# Patient Record
Sex: FEMALE | Race: WHITE
[De-identification: names, ages, dates, MRNs, and addresses within clinical notes are randomized per-mention and may not be internally consistent; named-entity substitution may affect disease eponyms.]

---

## 2017-10-03 ENCOUNTER — HOSPITAL ENCOUNTER (INPATIENT)
Dept: HOSPITAL 74 - JER | Age: 82
LOS: 3 days | Discharge: HOME | DRG: 89 | End: 2017-10-06
Attending: FAMILY MEDICINE | Admitting: FAMILY MEDICINE
Payer: COMMERCIAL

## 2017-10-03 VITALS — BODY MASS INDEX: 30.1 KG/M2

## 2017-10-03 DIAGNOSIS — E78.5: ICD-10-CM

## 2017-10-03 DIAGNOSIS — K21.9: ICD-10-CM

## 2017-10-03 DIAGNOSIS — I45.2: ICD-10-CM

## 2017-10-03 DIAGNOSIS — Z79.84: ICD-10-CM

## 2017-10-03 DIAGNOSIS — R51: ICD-10-CM
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
## 2017-10-03 DIAGNOSIS — J44.9: ICD-10-CM

## 2017-10-03 DIAGNOSIS — E03.9: ICD-10-CM
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
## 2017-10-03 DIAGNOSIS — R27.0: ICD-10-CM

## 2017-10-03 DIAGNOSIS — I10: ICD-10-CM

## 2017-10-03 DIAGNOSIS — E11.65: ICD-10-CM
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
## 2017-10-03 DIAGNOSIS — G62.9: ICD-10-CM

## 2017-10-03 DIAGNOSIS — S06.0X0A: Primary | ICD-10-CM

## 2017-10-03 LAB
ALBUMIN SERPL-MCNC: 3.8 G/DL (ref 3.4–5)
ALP SERPL-CCNC: 68 U/L (ref 45–117)
ALT SERPL-CCNC: 24 U/L (ref 12–78)
ANION GAP SERPL CALC-SCNC: 7 MMOL/L (ref 8–16)
APPEARANCE UR: CLEAR
AST SERPL-CCNC: 22 U/L (ref 15–37)
BASOPHILS # BLD: 0.7 % (ref 0–2)
BILIRUB SERPL-MCNC: 0.2 MG/DL (ref 0.2–1)
BILIRUB UR STRIP.AUTO-MCNC: NEGATIVE MG/DL
CALCIUM SERPL-MCNC: 8.7 MG/DL (ref 8.5–10.1)
CK SERPL-CCNC: 116 IU/L (ref 26–192)
CO2 SERPL-SCNC: 26 MMOL/L (ref 21–32)
COLOR UR: (no result)
CREAT SERPL-MCNC: 1.1 MG/DL (ref 0.55–1.02)
DEPRECATED RDW RBC AUTO: 15.4 % (ref 11.6–15.6)
EOSINOPHIL # BLD: 1 % (ref 0–4.5)
GLUCOSE SERPL-MCNC: 61 MG/DL (ref 74–106)
INR BLD: 1 (ref 0.82–1.09)
KETONES UR QL STRIP: NEGATIVE
LEUKOCYTE ESTERASE UR QL STRIP.AUTO: NEGATIVE
MCH RBC QN AUTO: 27.9 PG (ref 25.7–33.7)
MCHC RBC AUTO-ENTMCNC: 33 G/DL (ref 32–36)
MCV RBC: 84.5 FL (ref 80–96)
NEUTROPHILS # BLD: 67.8 % (ref 42.8–82.8)
NITRITE UR QL STRIP: NEGATIVE
PH UR: 5 [PH] (ref 5–8)
PLATELET # BLD AUTO: 192 K/MM3 (ref 134–434)
PMV BLD: 9.4 FL (ref 7.5–11.1)
PROT SERPL-MCNC: 7.9 G/DL (ref 6.4–8.2)
PROT UR QL STRIP: NEGATIVE
PROT UR QL STRIP: NEGATIVE
PT PNL PPP: 11 SEC (ref 9.98–11.88)
RBC # UR STRIP: NEGATIVE /UL
SP GR UR: 1.01 (ref 1–1.02)
TROPONIN I SERPL-MCNC: < 0.02 NG/ML (ref 0–0.05)
UROBILINOGEN UR STRIP-MCNC: NEGATIVE MG/DL (ref 0.2–1)
WBC # BLD AUTO: 7.8 K/MM3 (ref 4–10)

## 2017-10-03 NOTE — PDOC
History of Present Illness





- History of Present Illness


Initial Comments: 





10/03/17 18:57


The patient is a 82 year old female, with a significant past medical history of 

diabetes, peripheral neuropathies, and  HTN, who came into the emergency 

department with her family complaining of a persistent headache and dizziness(

lightheadedness) for 3 weeks. Patient states she has has been dizzy and 

lightheaded since falling at home 3 weeks ago. She also has an unsteady gait. 

She hit her head when she fell and had a CT of the head and neck done on 9/19 

at an outpatient clinic. CT results show no fractures or bleed. 





She denies recent fevers, chills. She denies recent vomit, diarrhea or 

constipation. She denies recent  dysuria, frequency, urgency or hematuria. She 

denies recent chest pain, abdominal pain,  or shortness of breath.








<Veronica Cherry - Last Filed: 10/03/17 21:24>





<Marion Lopez - Last Filed: 10/03/17 23:13>





- General


Chief Complaint: Nausea/Vomiting


Stated Complaint: DIZZINESS


Time Seen by Provider: 10/03/17 18:22





Past History





<Veronica Cherry - Last Filed: 10/03/17 21:24>





- Past Medical History


Anemia: No


Asthma: No


Cancer: No


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


CVA: No


COPD: No


CHF: No


Dementia: No


Diabetes: Yes


GI Disorders: Yes (ACID REFLUX)


 Disorders: No


HTN: Yes


Hypercholesterolemia: Yes


Liver Disease: No


Seizures: No


Thyroid Disease: Yes





- Surgical History


Abdominal Surgery: No


Appendectomy: No


Cardiac Surgery: No


Cholecystectomy: No


Lung Surgery: No


Neurologic Surgery: No


Orthopedic Surgery: No





- Suicide/Smoking/Psychosocial Hx


Smoking Status: No


Smoking History: Never smoked


Have you smoked in the past 12 months: No


Number of Cigarettes Smoked Daily: 0


Information on smoking cessation initiated: No


Hx Alcohol Use: No


Drug/Substance Use Hx: No


Substance Use Type: None


Hx Substance Use Treatment: No





<Marion Lopez - Last Filed: 10/03/17 23:13>





- Past Medical History


Allergies/Adverse Reactions: 


 Allergies











Allergy/AdvReac Type Severity Reaction Status Date / Time


 


shellfish derived AdvReac   Verified 10/03/17 17:57











Home Medications: 


Ambulatory Orders





Aspirin [ASA -] 81 mg PO DAILY #0 tab.chew 01/04/13 


Atorvastatin Calcium [Lipitor] 10 mg PO HS #0 tablet 01/04/13 


Levothyroxine [Synthroid -] 75 mcg PO DAILY@0700 #0 tablet 01/04/13 


Valsartan [Diovan] 80 mg PO DAILY 02/03/14 


Esomeprazole Magnesium [Nexium 24Hr] 20 mg PO DAILY 10/03/17 


Glimepiride [Amaryl -] 0 mg PO DAILY 10/03/17 


Sitagliptin Phos/Metformin HCl [Janumet  mg Tablet] 1 each PO BID 10/03/

17 











**Review of Systems





- Review of Systems


Comments:: 





10/03/17 18:59


CONSTITUTIONAL:


Present: dizziness/light-headedness


Absent: fever, no chills, no fatigue


EYES:


Absent: visual changes


ENT:


Absent: ear pain, no sore throat


CARDIOVASCULAR:


Absent: chest pain, no palpitations


RESPIRATORY:


Absent: cough, no SOB


GI:


Absent: abdominal pain, no nausea, no vomiting, no constipation, no diarrhea


GENITOURINARY:


Absent: dysuria, no frequency, no hematuria


MUSCULOSKELETAL:


Absent: back pain, no arthralgia, no myalgia


SKIN:


Absent: rash


NEURO:


Present: headache, unsteady gait








<Veronica Cherry - Last Filed: 10/03/17 21:24>





*Physical Exam





- Vital Signs


 Last Vital Signs











Temp Pulse Resp BP Pulse Ox


 


 98.9 F   100 H  16   157/92   99 


 


 10/03/17 17:54  10/03/17 17:54  10/03/17 17:54  10/03/17 17:54  10/03/17 17:54














- Physical Exam


Comments: 





10/03/17 19:01


GENERAL: 


Alert oriented X3. No LAC, no sutures. Well-appearing, well-nourished. No 

apparent distress.


HEENT: 


No pinpoint for cervical vertebral tendernesses.No evidence of any head 

trauma.Normocephalic, atraumatic. PERRL, EOM intact.


CARDIOVASCULAR: 


Normal S1, S2. Regular rate and rhythm.


PULMONARY: 


Clear to auscultation bilaterally.


ABDOMEN: 


Soft, non-distended, non-tender. 


EXTREMITIES: 


Normal ROM in all four extremities. No pitting edema. No gross deformities.


SKIN: 


Warm, dry.  No rash


NEUROLOGICAL: 


+ Unsteady gait. Headache. Dizziness. No focal neurological deficits.











<Veronica Cherry - Last Filed: 10/03/17 21:24>





- Vital Signs


 Last Vital Signs











Temp Pulse Resp BP Pulse Ox


 


 98.9 F   100 H  16   157/92   99 


 


 10/03/17 17:54  10/03/17 17:54  10/03/17 17:54  10/03/17 17:54  10/03/17 17:54














<Marion Lopez - Last Filed: 10/03/17 23:13>





ED Treatment Course





- LABORATORY


CBC & Chemistry Diagram: 


 10/03/17 18:38





 10/03/17 19:10





- Consult/PCP


Case discussed with consulting physician: Man Navarrete





- Additional Consults


Consult/PCP: Dr. Keyes/ Neuro 





<Veronica Cherry - Last Filed: 10/03/17 21:24>





- LABORATORY


CBC & Chemistry Diagram: 


 10/03/17 18:38





 10/03/17 19:10





<Marion Lopez - Last Filed: 10/03/17 23:13>





Medical Decision Making





- Medical Decision Making


10/03/17 21:04


82-year-old female presents with family members because of dizziness and some 

increased ataxia.


She reports that she fell on September 19 and came as an outpatient to 

radiology and had a CAT scan of her head and neck at that time.


Her primary doctor Center to outpatient radiology is Dr. Ray Rose.


On exam, she is ambulatory, alert and oriented 3, moving all her extremities.


She feels she has an unsteady gait that has worsened over the last couple days.





CAT scan of the head didn't show any interval development of 0.5 cm hypodense 

area consistent with an infarct in the right internal capsule


c/w acute infarct





10/03/17 23:03








<Marion Lopez - Last Filed: 10/03/17 23:13>





*DC/Admit/Observation/Transfer





- Attestations


Scribe Attestion: 





10/03/17 19:05





Documentation prepared by Veronica Cherry, acting as medical scribe for Marion Lopez MD.





<Veronica Cherry - Last Filed: 10/03/17 21:24>





- Discharge Dispostion


Admit: Yes





<Marion Lopez - Last Filed: 10/03/17 23:13>


Diagnosis at time of Disposition: 


 Ataxia





Stroke


Qualifiers:


 CVA mechanism: thrombosis Precerebral and cerebral artery: unspecified 

cerebral artery Qualified Code(s): I63.30 - Cerebral infarction due to 

thrombosis of unspecified cerebral artery





- Referrals

## 2017-10-04 LAB
ALBUMIN SERPL-MCNC: 3.3 G/DL (ref 3.4–5)
ALP SERPL-CCNC: 62 U/L (ref 45–117)
ALT SERPL-CCNC: 21 U/L (ref 12–78)
ANION GAP SERPL CALC-SCNC: 6 MMOL/L (ref 8–16)
AST SERPL-CCNC: 13 U/L (ref 15–37)
BASOPHILS # BLD: 0.7 % (ref 0–2)
BILIRUB SERPL-MCNC: 0.2 MG/DL (ref 0.2–1)
CALCIUM SERPL-MCNC: 8.8 MG/DL (ref 8.5–10.1)
CHOLEST SERPL-MCNC: 119 MG/DL (ref 50–200)
CK SERPL-CCNC: 73 IU/L (ref 26–192)
CO2 SERPL-SCNC: 29 MMOL/L (ref 21–32)
CREAT SERPL-MCNC: 0.9 MG/DL (ref 0.55–1.02)
DEPRECATED RDW RBC AUTO: 14.9 % (ref 11.6–15.6)
EOSINOPHIL # BLD: 1.7 % (ref 0–4.5)
GLUCOSE SERPL-MCNC: 61 MG/DL (ref 74–106)
MCH RBC QN AUTO: 27.7 PG (ref 25.7–33.7)
MCHC RBC AUTO-ENTMCNC: 33 G/DL (ref 32–36)
MCV RBC: 83.8 FL (ref 80–96)
NEUTROPHILS # BLD: 48.9 % (ref 42.8–82.8)
PLATELET # BLD AUTO: 182 K/MM3 (ref 134–434)
PMV BLD: 8.7 FL (ref 7.5–11.1)
PROT SERPL-MCNC: 6.8 G/DL (ref 6.4–8.2)
TROPONIN I SERPL-MCNC: < 0.02 NG/ML (ref 0–0.05)
TSH SERPL-ACNC: 1.14 UIU/ML (ref 0.36–3.74)
WBC # BLD AUTO: 7 K/MM3 (ref 4–10)

## 2017-10-04 PROCEDURE — B246ZZZ ULTRASONOGRAPHY OF RIGHT AND LEFT HEART: ICD-10-PCS | Performed by: INTERNAL MEDICINE

## 2017-10-04 RX ADMIN — INSULIN ASPART SCH: 100 INJECTION, SOLUTION INTRAVENOUS; SUBCUTANEOUS at 17:17

## 2017-10-04 RX ADMIN — METOPROLOL TARTRATE SCH MG: 25 TABLET, FILM COATED ORAL at 09:18

## 2017-10-04 RX ADMIN — LEVOTHYROXINE SODIUM SCH MCG: 75 TABLET ORAL at 06:24

## 2017-10-04 RX ADMIN — HEPARIN SODIUM SCH UNIT: 5000 INJECTION, SOLUTION INTRAVENOUS; SUBCUTANEOUS at 09:18

## 2017-10-04 RX ADMIN — INSULIN ASPART SCH: 100 INJECTION, SOLUTION INTRAVENOUS; SUBCUTANEOUS at 12:03

## 2017-10-04 RX ADMIN — HEPARIN SODIUM SCH UNIT: 5000 INJECTION, SOLUTION INTRAVENOUS; SUBCUTANEOUS at 21:07

## 2017-10-04 RX ADMIN — INSULIN ASPART SCH: 100 INJECTION, SOLUTION INTRAVENOUS; SUBCUTANEOUS at 21:14

## 2017-10-04 RX ADMIN — VALSARTAN SCH MG: 160 TABLET, FILM COATED ORAL at 09:18

## 2017-10-04 RX ADMIN — ATORVASTATIN CALCIUM SCH MG: 40 TABLET, FILM COATED ORAL at 21:06

## 2017-10-04 RX ADMIN — ASPIRIN SCH MG: 325 TABLET ORAL at 09:18

## 2017-10-04 NOTE — CONSULT
Consult


Consult Specialty:: endocrine


Reason for Consultation:: diabetes mellitus





- History of Present Illness


Chief Complaint: dizzyness weakness headache


History of Present Illness: 


82 year old female with a PMH of DM, peripheral neuropathy, and HTN. She 

presented to the ED complaining of a persistent headache and about 3 weeks of 

light headedness. She fell and hit her head about 3 weeks ago. ct scan 9/19  

negative,recurrent persistant dizzynes prompted er admission with new ct 

finding of cva, evolution





- History Source


History Provided By: Patient, Family Member





- Past Medical History


Cardio/Vascular: Yes: HTN, Hyperlipdemia


Pulmonary: No: COPD


Endocrine: Yes: Diabetes Mellitus





- Alcohol/Substance Use


Hx Alcohol Use: No





- Smoking History


Smoking history: Never smoked


Have you smoked in the past 12 months: No


Aproximately how many cigarettes per day: 0





Home Medications





- Allergies


Allergies/Adverse Reactions: 


 Allergies











Allergy/AdvReac Type Severity Reaction Status Date / Time


 


shellfish derived AdvReac   Verified 10/03/17 17:57














- Home Medications


Home Medications: 


Ambulatory Orders





Aspirin [ASA -] 81 mg PO DAILY #0 tab.chew 01/04/13 


Atorvastatin Calcium [Lipitor] 10 mg PO HS #0 tablet 01/04/13 


Levothyroxine [Synthroid -] 75 mcg PO DAILY@0700 #0 tablet 01/04/13 


Valsartan [Diovan] 80 mg PO DAILY 02/03/14 


Esomeprazole Magnesium [Nexium 24Hr] 20 mg PO DAILY 10/03/17 


Glimepiride [Amaryl -] 0 mg PO DAILY 10/03/17 


Sitagliptin Phos/Metformin HCl [Janumet  mg Tablet] 1 each PO BID 10/03/

17 











Review of Systems





- Review of Systems


Constitutional: reports: Loss of Appetite


Eyes: reports: Blurred Vision


HENT: reports: No Symptoms, Ringing in Ears


Neck: reports: Decreased ROM


Cardiovascular: reports: Shortness of Breath


Respiratory: reports: Exercise Intolerance, SOB on Exertion


Gastrointestinal: reports: Constipation


Genitourinary: reports: No Symptoms


Breasts: reports: No Symptoms Reported


Musculoskeletal: reports: Joint Swelling, Muscle Pain, Muscle Weakness


Integumentary: reports: No Symptoms


Neurological: reports: Dizziness, Unsteady Gait, Weakness


Endocrine: reports: Unexplained Weight Gain





Physical Exam


Vital Signs: 


 Vital Signs











Temperature  98.4 F   10/04/17 21:00


 


Pulse Rate  662 H  10/04/17 21:00


 


Respiratory Rate  20   10/04/17 21:00


 


Blood Pressure  148/90   10/04/17 21:00


 


O2 Sat by Pulse Oximetry (%)  98   10/04/17 20:26











Constitutional: Yes: Anxious


Eyes: Yes: EOM Intact


HENT: Yes: Normocephalic


Neck: Yes: Trachea Midline


Cardiovascular: Yes: Regular Rate and Rhythm


Respiratory: Yes: CTA Bilaterally


Gastrointestinal: Yes: Normal Bowel Sounds


...Rectal Exam: Yes: Deferred


Renal/: Yes: WNL


Breast(s): Yes: WNL


Musculoskeletal: Yes: Back Pain, Joint Stiffness, Muscle Weakness


Extremities: Yes: WNL


Edema: No


Peripheral Pulses WNL: Yes


Neurological: Yes: Alert, Oriented, Numbness, Weakness


...Motor Strength: WNL


Labs: 


 CBC, BMP





 10/04/17 05:10 





 10/04/17 05:10 











Problem List





- Problems


(1) Ataxia


Code(s): R27.0 - ATAXIA, UNSPECIFIED





(2) Diabetes


Code(s): E11.9 - TYPE 2 DIABETES MELLITUS WITHOUT COMPLICATIONS   





(3) HLD (hyperlipidemia)


Code(s): E78.5 - HYPERLIPIDEMIA, UNSPECIFIED   





(4) HTN (hypertension)


Code(s): I10 - ESSENTIAL (PRIMARY) HYPERTENSION   





(5) Stroke


Code(s): I63.9 - CEREBRAL INFARCTION, UNSPECIFIED   Qualifiers: 


     CVA mechanism: thrombosis     Precerebral and cerebral artery: unspecified 

cerebral artery     Qualified Code(s): I63.30 - Cerebral infarction due to 

thrombosis of unspecified cerebral artery; I63.30 - Cerebral infarction due to 

thrombosis of unspecified cerebral artery  








Assessment/Plan


Current Active Problems





Ataxia (Acute) 


Diabetes (Acute) 


HLD (hyperlipidemia) (Acute) 


HTN (hypertension) (Acute) 


Stroke (Acute) 





diabetes mellitus type 2


hyperglycemia


hypothyroidism


 Abnormal Lab Results











  10/04/17 10/04/17 10/04/17





  05:10 05:10 05:10


 


Monocytes %  11.1 H  


 


Anion Gap   6 L 


 


BUN   21 H 


 


Random Glucose   61 L 


 


Hemoglobin A1c %    6.1 H D


 


AST   13 L D 


 


Albumin   3.3 L 


 


Triglycerides   209 H 








 Laboratory Results - last 24 hr











  10/04/17 10/04/17 10/04/17





  05:10 05:10 05:10


 


WBC  7.0  


 


RBC  4.39  


 


Hgb  12.2  


 


Hct  36.8  


 


MCV  83.8  


 


MCH  27.7  


 


MCHC  33.0  


 


RDW  14.9  


 


Plt Count  182  


 


MPV  8.7  


 


Neutrophils %  48.9  D  


 


Lymphocytes %  37.6  D  


 


Monocytes %  11.1 H  


 


Eosinophils %  1.7  


 


Basophils %  0.7  


 


Sodium   142 


 


Potassium   4.3 


 


Chloride   107 


 


Carbon Dioxide   29 


 


Anion Gap   6 L 


 


BUN   21 H 


 


Creatinine   0.9 


 


Creat Clearance w eGFR   > 60 


 


POC Glucometer   


 


Random Glucose   61 L 


 


Hemoglobin A1c %    6.1 H D


 


Calcium   8.8 


 


Total Bilirubin   0.2 


 


AST   13 L D 


 


ALT   21 


 


Alkaline Phosphatase   62 


 


Creatine Kinase   73 


 


Troponin I   < 0.02 


 


C-Reactive Protein   


 


Total Protein   6.8 


 


Albumin   3.3 L 


 


Triglycerides   209 H 


 


Cholesterol   119 


 


Total LDL Cholesterol   52 


 


HDL Cholesterol   50 


 


TSH   1.14 














  10/04/17 10/04/17 10/04/17





  09:35 12:03 15:37


 


WBC   


 


RBC   


 


Hgb   


 


Hct   


 


MCV   


 


MCH   


 


MCHC   


 


RDW   


 


Plt Count   


 


MPV   


 


Neutrophils %   


 


Lymphocytes %   


 


Monocytes %   


 


Eosinophils %   


 


Basophils %   


 


Sodium   


 


Potassium   


 


Chloride   


 


Carbon Dioxide   


 


Anion Gap   


 


BUN   


 


Creatinine   


 


Creat Clearance w eGFR   


 


POC Glucometer   102  154


 


Random Glucose   


 


Hemoglobin A1c %   


 


Calcium   


 


Total Bilirubin   


 


AST   


 


ALT   


 


Alkaline Phosphatase   


 


Creatine Kinase   


 


Troponin I   


 


C-Reactive Protein  < 0.3  


 


Total Protein   


 


Albumin   


 


Triglycerides   


 


Cholesterol   


 


Total LDL Cholesterol   


 


HDL Cholesterol   


 


TSH   














  10/04/17





  21:11


 


WBC 


 


RBC 


 


Hgb 


 


Hct 


 


MCV 


 


MCH 


 


MCHC 


 


RDW 


 


Plt Count 


 


MPV 


 


Neutrophils % 


 


Lymphocytes % 


 


Monocytes % 


 


Eosinophils % 


 


Basophils % 


 


Sodium 


 


Potassium 


 


Chloride 


 


Carbon Dioxide 


 


Anion Gap 


 


BUN 


 


Creatinine 


 


Creat Clearance w eGFR 


 


POC Glucometer  112


 


Random Glucose 


 


Hemoglobin A1c % 


 


Calcium 


 


Total Bilirubin 


 


AST 


 


ALT 


 


Alkaline Phosphatase 


 


Creatine Kinase 


 


Troponin I 


 


C-Reactive Protein 


 


Total Protein 


 


Albumin 


 


Triglycerides 


 


Cholesterol 


 


Total LDL Cholesterol 


 


HDL Cholesterol 


 


TSH 








plan:


bgm qid novolog insulin dose titrate


 Current Medications











Generic Name Dose Route Start Last Admin





  Trade Name Freq  PRN Reason Stop Dose Admin


 


Acetaminophen  650 mg 10/03/17 23:02  





  Tylenol -  PO   





  Q4H PRN   





  FEVER OR PAIN   


 


Aspirin  325 mg 10/04/17 10:00 10/04/17 09:18





  Ecotrin -  PO   325 mg





  DAILY STEVE   Administration


 


Atorvastatin Calcium  40 mg 10/04/17 22:00 10/04/17 21:06





  Lipitor -  PO   40 mg





  HS STEVE   Administration


 


Heparin Sodium (Porcine)  5,000 unit 10/04/17 10:00 10/04/17 21:07





  Heparin -  SQ   5,000 unit





  BID STEVE   Administration


 


Insulin Aspart  1 vial 10/04/17 11:00 10/04/17 21:14





  Novolog Vial Sliding Scale -  SQ   Not Given





  [facility]   





  Protocol   


 


Levothyroxine Sodium  75 mcg 10/04/17 07:00 10/04/17 06:24





  Synthroid -  PO   75 mcg





  DAILY@0700 STEVE   Administration


 


Metoprolol Tartrate  25 mg 10/04/17 10:00 10/04/17 09:18





  Lopressor -  PO   25 mg





  DAILY STEVE   Administration


 


Valsartan  160 mg 10/04/17 10:00 10/04/17 09:18





  Diovan -  PO   160 mg





  DAILY STEVE   Administration








continue synthroid 75mcg

## 2017-10-04 NOTE — CON.CARD
Consult


Consult Specialty:: Cardiology


Reason for Consultation:: S/P fall.  CVA





- History of Present Illness


Chief Complaint: Presently comfrotable


History of Present Illness: 


This is an 82 year old female with a PMH of DM, peripheral neuropathy, and HTN. 

She presented to the ED complaining of a persistent headache and about 3 weeks 

of light headedness. She fell and hit her head about 3 weeks ago. 


CT of the head and neck done on 9/19 at an outpatient clinic. CT results show 

no fractures or bleed. 


HD CT 10/3/17 : possible R Internal capsule subacute CVA 





Echocardiogram 10/4/17:


NL LV FXN


NL RV FXN


Moderate LA dilatation


Mild MR/TR


EF 65%


RVSP 26 mmHg





EKG NSR RBBB LAFB


Carotid Doppler mild dz





- Past Medical History


Cardio/Vascular: Yes: HTN, Hyperlipdemia


Pulmonary: No: COPD


Endocrine: Yes: Diabetes Mellitus





- Alcohol/Substance Use


Hx Alcohol Use: No





- Smoking History


Smoking history: Never smoked


Have you smoked in the past 12 months: No


Aproximately how many cigarettes per day: 0





Home Medications





- Allergies


Allergies/Adverse Reactions: 


 Allergies











Allergy/AdvReac Type Severity Reaction Status Date / Time


 


shellfish derived AdvReac   Verified 10/03/17 17:57














- Home Medications


Home Medications: 


Ambulatory Orders





Aspirin [ASA -] 81 mg PO DAILY #0 tab.chew 01/04/13 


Atorvastatin Calcium [Lipitor] 10 mg PO HS #0 tablet 01/04/13 


Levothyroxine [Synthroid -] 75 mcg PO DAILY@0700 #0 tablet 01/04/13 


Valsartan [Diovan] 80 mg PO DAILY 02/03/14 


Esomeprazole Magnesium [Nexium 24Hr] 20 mg PO DAILY 10/03/17 


Glimepiride [Amaryl -] 0 mg PO DAILY 10/03/17 


Sitagliptin Phos/Metformin HCl [Janumet  mg Tablet] 1 each PO BID 10/03/

17 











Review of Systems


Unable to obtain ROS, reason: As per HPI


Vital Signs: 


 Vital Signs











Temperature  98.3 F   10/04/17 14:00


 


Pulse Rate  63   10/04/17 14:00


 


Respiratory Rate  18   10/04/17 14:00


 


Blood Pressure  136/62   10/04/17 14:00


 


O2 Sat by Pulse Oximetry (%)  98   10/04/17 09:00











Constitutional: Yes: Well Nourished, No Distress


Neck: Yes: WNL


Respiratory: Yes: CTA Bilaterally


Gastrointestinal: Yes: Soft


Cardiovascular: Yes: Regular Rate and Rhythm (NL S1S2 no MRHG)


Edema: No


Neurological: Yes: Alert (Grossly non focal), Oriented





- Other Data


Labs, Other Data: 


 CBC, BMP





 10/04/17 05:10 





 10/04/17 05:10 





 INR, PTT











INR  1.00  (0.82-1.09)   10/03/17  22:00    








 Troponin, BNP











  10/04/17





  05:10


 


Troponin I  < 0.02








 Troponin, BNP











  10/04/17





  05:10


 


Troponin I  < 0.02














Assessment/Plan





CVA


Presently in NSR and hemodynamically stable


Contniue ASA 81 mg PO daily


Continue Liptitor 10 mg QHS





BP


Presently BP is well controlled


continue valsartan 80 mg PO dialy

## 2017-10-04 NOTE — HP
Admitting History and Physical





- Admission


History of Present Illness: 


82 year old female, with a significant past medical history of diabetes, 

peripheral neuropathies, and  HTN, who came into the emergency department with 

her family complaining of a persistent headache and dizziness(lightheadedness) 

for 3 weeks. Patient states she has has been dizzy and lightheaded since 

falling at home 3 weeks ago. She also has an unsteady gait. She hit her head 

when she fell and had a CT of the head and neck done on 9/19 at an outpatient 

clinic. CT results show no fractures or bleed. 





- Past Medical History


Cardiovascular: Yes: HTN, Hyperlipdemia


Pulmonary: No: COPD


Endocrine: Yes: Diabetes Mellitus





- Smoking History


Smoking history: Never smoked


Have you smoked in the past 12 months: No


Aproximately how many cigarettes per day: 0





- Alcohol/Substance Use


Hx Alcohol Use: No





<Man Navarrete - Last Filed: 10/04/17 08:36>





Home Medications





<Man Navarrete - Last Filed: 10/04/17 08:36>





<Marion Lopez - Last Filed: 10/10/17 02:35>





- Allergies


Allergies/Adverse Reactions: 


 Allergies











Allergy/AdvReac Type Severity Reaction Status Date / Time


 


shellfish derived AdvReac   Verified 10/03/17 17:57














- Home Medications


Home Medications: 


Ambulatory Orders





Aspirin [ASA -] 81 mg PO DAILY #0 tab.chew 01/04/13 


Atorvastatin Calcium [Lipitor] 10 mg PO HS #0 tablet 01/04/13 


Levothyroxine [Synthroid -] 75 mcg PO DAILY@0700 #0 tablet 01/04/13 


Aspirin [ASA -] 81 mg PO DAILY  tab.chew 10/06/17 


Atorvastatin Ca [Lipitor] 40 mg PO HS #30 tablet MDD 1 10/06/17 


Metoprolol Tartrate [Lopressor -] 12.5 mg PO BID #30 tablet MDD 2 10/06/17 


Valsartan [Diovan] 160 mg PO DAILY #30 tablet MDD 1 10/06/17 











Review of Systems





- Review of Systems


Constitutional: reports: Weakness


Cardiovascular: denies: Chest Pain


Respiratory: denies: Orthopnea, SOB


Gastrointestinal: denies: Abdominal Pain


Neurological: reports: Headache, Incoordination, Parasthesia





<Man Navarrete - Last Filed: 10/04/17 08:36>





Physical Examination


Vital Signs: 


 Vital Signs











Temperature  98.9 F   10/04/17 05:25


 


Pulse Rate  74   10/04/17 05:25


 


Respiratory Rate  18   10/04/17 05:25


 


Blood Pressure  140/64   10/04/17 05:25


 


O2 Sat by Pulse Oximetry (%)  99   10/03/17 23:35











Cardiovascular: Yes: S1, S2


Respiratory: Yes: Regular, CTA Bilaterally


Gastrointestinal: Yes: Normal Bowel Sounds, Soft.  No: Tenderness


Edema: No


Neurological: Yes: Alert, Oriented


Labs: 


 CBC, BMP





 10/04/17 05:10 





 10/04/17 05:10 











<Man Navarrete - Last Filed: 10/04/17 08:36>


Vital Signs: 


 Vital Signs











Temperature  98.2 F   10/06/17 08:47


 


Pulse Rate  84   10/06/17 08:47


 


Respiratory Rate  18   10/06/17 08:47


 


Blood Pressure  128/74   10/06/17 08:47


 


O2 Sat by Pulse Oximetry (%)  96   10/06/17 08:47











Labs: 


 CBC, BMP





 10/04/17 05:10 





 10/04/17 05:10 











<Marion Lopez - Last Filed: 10/10/17 02:35>





Imaging





- Results


Cat Scan: Report Reviewed





<Man Navarrete - Last Filed: 10/04/17 08:36>





Problem List





- Problems


(1) Stroke


Assessment/Plan: 


MRI OF HEAD


ASA


STATIN


NEURO


CAROTID


TELE


Code(s): I63.9 - CEREBRAL INFARCTION, UNSPECIFIED   Qualifiers: 


     CVA mechanism: thrombosis     Precerebral and cerebral artery: unspecified 

cerebral artery        Qualified Code(s): I63.30 - Cerebral infarction due to 

thrombosis of unspecified cerebral artery; I63.30 - Cerebral infarction due to 

thrombosis of unspecified cerebral artery  





(2) HTN (hypertension)


Assessment/Plan: 


INCREASE DIOVAN 


CONTINUE WITH METOPROLOL


Code(s): I10 - ESSENTIAL (PRIMARY) HYPERTENSION   





(3) HLD (hyperlipidemia)


Assessment/Plan: 


ON STATIN


CHECK LIPIDS


Code(s): E78.5 - HYPERLIPIDEMIA, UNSPECIFIED   





(4) Diabetes


Assessment/Plan: 


A1C


BGM


Code(s): E11.9 - TYPE 2 DIABETES MELLITUS WITHOUT COMPLICATIONS   








<IvethadeleMikaylaabundio - Last Filed: 10/04/17 08:36>





NIH Stroke Scale





- Last Known Well Date/Time & Onset


Date Last Known Well: 10/01/17


Time Last Known Well: 08:00 (not sure of specific time)





- Initial Evaluation


Level of consciousness: Alert


Ask patient the month and their age: Answers both correctly


Ask patient to open & close eyes; make fist and let go: Obeys both correctly


Best gaze (horizontal eye movement): Normal


Visual field testing: No visual field loss


Facial paresis (Show teeth/raise eyebrows/close eyes tight): Normal symmetrical 

movement


Motor Function: Left Arm: Normal


Motor Function:  Right Arm: Normal (extends arm 90 (or 45) degrees for 10 

seconds without drift


Motor Function:  Left Leg: Normal (extends leg 30 degrees for 5 seconds without 

drift)


Motor Function:  Right Leg: Normal (extends leg 30 degrees for 5 seconds 

without drift)


Limb Ataxia: Present in one limb


Sensory(Use pinprick test arms,legs,trunk,face/side to side): Normal


Best language (Describe picture, name items, read sentences): No Aphasia


Dysarthria (read several words): Normal articulation


Extinction and Inattention: No abnormality





- Total Score


NIH Stroke Scale Score: 1





<Marion Lopez - Last Filed: 10/10/17 02:35>

## 2017-10-04 NOTE — EKG
Test Reason : 

Blood Pressure : ***/*** mmHG

Vent. Rate : 076 BPM     Atrial Rate : 076 BPM

   P-R Int : 138 ms          QRS Dur : 124 ms

    QT Int : 398 ms       P-R-T Axes : 030 -60 -01 degrees

   QTc Int : 447 ms

 

NORMAL SINUS RHYTHM

RIGHT BUNDLE BRANCH BLOCK

LEFT ANTERIOR FASCICULAR BLOCK

*** BIFASCICULAR BLOCK ***

INFERIOR INFARCT , AGE UNDETERMINED

ABNORMAL ECG

WHEN COMPARED WITH ECG OF 03-OCT-2017 18:57,

NONSPECIFIC T WAVE ABNORMALITY HAS REPLACED INVERTED T WAVES IN 

ANTERIOR LEADS

Confirmed by LANEY GOLDBERG, NAHED ([id number]) on 10/4/2017 11:50:28 AM

 

Referred By: ANIYAH REMY           Confirmed By:NAHED DAVISON MD

## 2017-10-04 NOTE — CON.NEURO
Consult





- History of Present Illness


History of Present Illness: 


82 year old female, with a significant past medical history of diabetes, 

peripheral neuropathies, and  HTN, who came into the emergency department with 

her family complaining of a persistent headache and dizziness(lightheadedness) 

for 3 weeks. Patient states she has has been dizzy and lightheaded since 

falling at home 3 weeks ago. She recalls incident, she was going to kitchen and 

then living room , felt dizziness and hit head on L. CT of the head and neck 

done on 9/19 at an outpatient clinic. CT results show no fractures or bleed. no 

focal weakness or slurred speech or numbness.


HD CT 10/3/17 : possible R Internal capsule subacute CVA 











- History Source


History Provided By: Patient





- Past Medical History


Cardio/Vascular: Yes: HTN, Hyperlipdemia


Pulmonary: No: COPD


Endocrine: Yes: Diabetes Mellitus





- Alcohol/Substance Use


Hx Alcohol Use: No





- Smoking History


Smoking history: Never smoked


Have you smoked in the past 12 months: No


Aproximately how many cigarettes per day: 0





Home Medications





- Allergies


Allergies/Adverse Reactions: 


 Allergies











Allergy/AdvReac Type Severity Reaction Status Date / Time


 


shellfish derived AdvReac   Verified 10/03/17 17:57














- Home Medications


Home Medications: 


Ambulatory Orders





Aspirin [ASA -] 81 mg PO DAILY #0 tab.chew 01/04/13 


Atorvastatin Calcium [Lipitor] 10 mg PO HS #0 tablet 01/04/13 


Levothyroxine [Synthroid -] 75 mcg PO DAILY@0700 #0 tablet 01/04/13 


Valsartan [Diovan] 80 mg PO DAILY 02/03/14 


Esomeprazole Magnesium [Nexium 24Hr] 20 mg PO DAILY 10/03/17 


Glimepiride [Amaryl -] 0 mg PO DAILY 10/03/17 


Sitagliptin Phos/Metformin HCl [Janumet  mg Tablet] 1 each PO BID 10/03/

17 











Physical Exam-Neuro


Vital Signs: 


 Vital Signs











Temperature  98.9 F   10/04/17 05:25


 


Pulse Rate  74   10/04/17 05:25


 


Respiratory Rate  18   10/04/17 05:25


 


Blood Pressure  140/64   10/04/17 05:25


 


O2 Sat by Pulse Oximetry (%)  99   10/03/17 23:35











Constitutional: Yes: Well Nourished, No Distress


Labs: 


 CBC, BMP





 10/04/17 05:10 





 10/04/17 05:10 





 INR, PTT











INR  1.00  (0.82-1.09)   10/03/17  22:00    














- Neuro Exam


Level Of Consciousness: Yes: Alert (awake, alert, no dysarthria , EOMI, no 

facial, motor 5/5, no drift, no ataxia, reflexes symmetric )





Imaging





- Results


Cat Scan: Report Reviewed, Image Reviewed





Problem List





- Problems


(1) Diabetes


Code(s): E11.9 - TYPE 2 DIABETES MELLITUS WITHOUT COMPLICATIONS   





(2) HLD (hyperlipidemia)


Code(s): E78.5 - HYPERLIPIDEMIA, UNSPECIFIED   





(3) HTN (hypertension)


Code(s): I10 - ESSENTIAL (PRIMARY) HYPERTENSION   





(4) Stroke


Code(s): I63.9 - CEREBRAL INFARCTION, UNSPECIFIED   Qualifiers: 


     CVA mechanism: thrombosis     Precerebral and cerebral artery: unspecified 

cerebral artery     Qualified Code(s): I63.30 - Cerebral infarction due to 

thrombosis of unspecified cerebral artery; I63.30 - Cerebral infarction due to 

thrombosis of unspecified cerebral artery  








Assessment/Plan


82 year old female, with a significant past medical history of diabetes, 

peripheral neuropathies, and  HTN, who came into the emergency department with 

her family complaining of a persistent headache and dizziness(lightheadedness) 

for 3 weeks. Patient states she has has been dizzy and lightheaded since 

falling at home 3 weeks ago. HD CT 10/3/17 : possible R Internal capsule 

subacute CVA


nonfocal exam -- possible post concussive , vs new stroke, also r/o temporal 

arteritis


check MRI BRAIN , ESR , CRP 





Dr Santizo 


[id number]

## 2017-10-04 NOTE — EKG
Test Reason : 

Blood Pressure : ***/*** mmHG

Vent. Rate : 090 BPM     Atrial Rate : 090 BPM

   P-R Int : 138 ms          QRS Dur : 124 ms

    QT Int : 374 ms       P-R-T Axes : 017 -58 -05 degrees

   QTc Int : 457 ms

 

*** POOR DATA QUALITY, INTERPRETATION MAY BE ADVERSELY AFFECTED

NORMAL SINUS RHYTHM

POSSIBLE LEFT ATRIAL ENLARGEMENT

RIGHT BUNDLE BRANCH BLOCK

LEFT ANTERIOR FASCICULAR BLOCK

*** BIFASCICULAR BLOCK ***

ABNORMAL ECG

WHEN COMPARED WITH ECG OF 01-JAN-2013 11:13,

(RBBB AND LEFT ANTERIOR FASCICULAR BLOCK) IS NOW PRESENT

Confirmed by LANEY GOLDBERG, NAHED ([id number]) on 10/4/2017 11:16:55 AM

 

Referred By:             Confirmed By:NAHED DAVISON MD

## 2017-10-05 LAB
T4 FREE SERPL-MCNC: 1.24 NG/DL (ref 0.76–1.46)
TSH SERPL-ACNC: 1.2 UIU/ML (ref 0.36–3.74)

## 2017-10-05 RX ADMIN — INSULIN ASPART SCH: 100 INJECTION, SOLUTION INTRAVENOUS; SUBCUTANEOUS at 21:01

## 2017-10-05 RX ADMIN — ASPIRIN SCH MG: 325 TABLET ORAL at 09:26

## 2017-10-05 RX ADMIN — LEVOTHYROXINE SODIUM SCH MCG: 75 TABLET ORAL at 06:25

## 2017-10-05 RX ADMIN — HEPARIN SODIUM SCH UNIT: 5000 INJECTION, SOLUTION INTRAVENOUS; SUBCUTANEOUS at 09:26

## 2017-10-05 RX ADMIN — INSULIN ASPART SCH: 100 INJECTION, SOLUTION INTRAVENOUS; SUBCUTANEOUS at 06:24

## 2017-10-05 RX ADMIN — ATORVASTATIN CALCIUM SCH MG: 40 TABLET, FILM COATED ORAL at 21:02

## 2017-10-05 RX ADMIN — INSULIN ASPART SCH: 100 INJECTION, SOLUTION INTRAVENOUS; SUBCUTANEOUS at 17:04

## 2017-10-05 RX ADMIN — INSULIN ASPART SCH: 100 INJECTION, SOLUTION INTRAVENOUS; SUBCUTANEOUS at 11:35

## 2017-10-05 RX ADMIN — HEPARIN SODIUM SCH: 5000 INJECTION, SOLUTION INTRAVENOUS; SUBCUTANEOUS at 21:02

## 2017-10-05 RX ADMIN — METOPROLOL TARTRATE SCH MG: 25 TABLET, FILM COATED ORAL at 09:26

## 2017-10-05 RX ADMIN — VALSARTAN SCH MG: 160 TABLET, FILM COATED ORAL at 09:26

## 2017-10-05 NOTE — PN
Progress Note, Physician


Chief Complaint: 


Presently comfortable


History of Present Illness: 


This is an 82 year old female with a PMH of DM, peripheral neuropathy, and HTN. 

She presented to the ED complaining of a persistent headache and about 3 weeks 

of light headedness. She fell and hit her head about 3 weeks ago. 


CT of the head and neck done on 9/19 at an outpatient clinic. CT results show 

no fractures or bleed. 


HD CT 10/3/17 : possible R Internal capsule subacute CVA 





Echocardiogram 10/4/17:


NL LV FXN


NL RV FXN


Moderate LA dilatation


Mild MR/TR


EF 65%


RVSP 26 mmHg





EKG NSR RBBB LAFB


Carotid Doppler mild dz





- Current Medication List


Current Medications: 


Active Medications





Acetaminophen (Tylenol -)  650 mg PO Q4H PRN


   PRN Reason: FEVER OR PAIN


Aspirin (Asa -)  81 mg PO DAILY [facility]


Atorvastatin Calcium (Lipitor -)  40 mg PO HS [facility]


   Last Admin: 10/04/17 21:06 Dose:  40 mg


Heparin Sodium (Porcine) (Heparin -)  5,000 unit SQ BID [facility]


   Last Admin: 10/05/17 09:26 Dose:  5,000 unit


Insulin Aspart (Novolog Vial Sliding Scale -)  1 vial SQ ACHS [facility]


   PRN Reason: Protocol


   Last Admin: 10/05/17 11:35 Dose:  Not Given


Levothyroxine Sodium (Synthroid -)  75 mcg PO DAILY@0700 [facility]


   Last Admin: 10/05/17 06:25 Dose:  75 mcg


Metoprolol Tartrate (Lopressor -)  25 mg PO DAILY [facility]


   Last Admin: 10/05/17 09:26 Dose:  25 mg


Valsartan (Diovan -)  160 mg PO DAILY [facility]


   Last Admin: 10/05/17 09:26 Dose:  160 mg











- Objective


Vital Signs: 


 Vital Signs











Temperature  98.1 F   10/05/17 15:00


 


Pulse Rate  66   10/05/17 15:00


 


Respiratory Rate  18   10/05/17 15:00


 


Blood Pressure  113/59   10/05/17 15:00


 


O2 Sat by Pulse Oximetry (%)  97   10/05/17 09:00











Constitutional: Yes: Well Nourished, No Distress


Neck: Yes: WNL


Cardiovascular: Yes: Regular Rate and Rhythm (NL S1S2, no MRHG)


Respiratory: Yes: CTA Bilaterally


Gastrointestinal: Yes: Soft


Extremities: Yes: WNL


Edema: No


Neurological: Yes: Alert, Oriented


Labs: 


 CBC, BMP





 10/04/17 05:10 





 10/04/17 05:10 





 INR, PTT











INR  1.00  (0.82-1.09)   10/03/17  22:00    














Assessment/Plan


CVA


Presently in NSR and hemodynamically stable


Contniue ASA 81 mg PO daily


Continue Liptitor 40 mg QHS





BP


Presently BP is well controlled


continue valsartan 160 mg PO dialy


Continue metoprolol tartrate but would give it BID in divided doses since it is 

short acting (12.5 mg PO Q12h)

## 2017-10-05 NOTE — PN
Progress Note, Physician


Chief Complaint: 


patient seen today she says that today she is feeling better , dizziness is 

slightly better


gets dizzy when she ambulates to bathroom





- Current Medication List


Current Medications: 


Active Medications





Acetaminophen (Tylenol -)  650 mg PO Q4H PRN


   PRN Reason: FEVER OR PAIN


Aspirin (Ecotrin -)  325 mg PO DAILY [facility]


   Last Admin: 10/05/17 09:26 Dose:  325 mg


Atorvastatin Calcium (Lipitor -)  40 mg PO HS [facility]


   Last Admin: 10/04/17 21:06 Dose:  40 mg


Heparin Sodium (Porcine) (Heparin -)  5,000 unit SQ BID [facility]


   Last Admin: 10/05/17 09:26 Dose:  5,000 unit


Insulin Aspart (Novolog Vial Sliding Scale -)  1 vial SQ ACHS [facility]


   PRN Reason: Protocol


   Last Admin: 10/05/17 06:24 Dose:  Not Given


Levothyroxine Sodium (Synthroid -)  75 mcg PO DAILY@0700 [facility]


   Last Admin: 10/05/17 06:25 Dose:  75 mcg


Metoprolol Tartrate (Lopressor -)  25 mg PO DAILY [facility]


   Last Admin: 10/05/17 09:26 Dose:  25 mg


Valsartan (Diovan -)  160 mg PO DAILY [facility]


   Last Admin: 10/05/17 09:26 Dose:  160 mg











- Objective


Vital Signs: 


 Vital Signs











Temperature  97.9 F   10/05/17 05:00


 


Pulse Rate  69   10/05/17 05:00


 


Respiratory Rate  20   10/05/17 05:00


 


Blood Pressure  113/60   10/05/17 05:00


 


O2 Sat by Pulse Oximetry (%)  98   10/04/17 20:26











Constitutional: Yes: Calm, Thin


Neck: Yes: Trachea Midline


Cardiovascular: Yes: Regular Rate and Rhythm, S1, S2


Respiratory: Yes: CTA Bilaterally


Edema: No


Neurological: Yes: Alert, Oriented


Labs: 


 CBC, BMP





 10/04/17 05:10 





 10/04/17 05:10 





 INR, PTT











INR  1.00  (0.82-1.09)   10/03/17  22:00    














Problem List





- Problems


(1) Ataxia


Assessment/Plan: 


PT


seen by neuro MRI shows old BG infarct 


carotid doppler ok


ECHO ok as well





Code(s): R27.0 - ATAXIA, UNSPECIFIED





(2) Diabetes


Assessment/Plan: 


hga1c 6.1


bgm ok 


seen by endo


Code(s): E11.9 - TYPE 2 DIABETES MELLITUS WITHOUT COMPLICATIONS   





(3) HLD (hyperlipidemia)


Assessment/Plan: 


statin


Code(s): E78.5 - HYPERLIPIDEMIA, UNSPECIFIED   





(4) HTN (hypertension)


Assessment/Plan: 


diovan


Code(s): I10 - ESSENTIAL (PRIMARY) HYPERTENSION   





(5) Stroke


Assessment/Plan: 


h/o cvs


aspirin, statin





Code(s): I63.9 - CEREBRAL INFARCTION, UNSPECIFIED   Qualifiers: 


     CVA mechanism: thrombosis     Precerebral and cerebral artery: unspecified 

cerebral artery     Qualified Code(s): I63.30 - Cerebral infarction due to 

thrombosis of unspecified cerebral artery; I63.30 - Cerebral infarction due to 

thrombosis of unspecified cerebral artery  





(6) Hypothyroid


Assessment/Plan: 


on synthroid


Code(s): E03.9 - HYPOTHYROIDISM, UNSPECIFIED

## 2017-10-05 NOTE — PN
Progress Note (short form)





- Note


Progress Note: 


82 year old female, with a significant past medical history of diabetes, 

peripheral neuropathies, and  HTN, who came into the emergency department with 

her family complaining of a persistent headache and dizziness(lightheadedness) 

for 3 weeks. Patient states she has has been dizzy and lightheaded since 

falling at home 3 weeks ago. She recalls incident, she was going to kitchen and 

then living room , felt dizziness and hit head on L. CT of the head and neck 

done on 9/19 at an outpatient clinic. CT results show no fractures or bleed. no 

focal weakness or slurred speech or numbness.


HD CT 10/3/17 : possible R Internal capsule subacute CVA 





FU : 


still minor HA 


ESR -P 


MRI noted chronic BG infarct, no acute stroke, Doppler - no hemodyn. sig 

stenosis








- History Source


History Provided By: Patient





- Past Medical History


Cardio/Vascular: Yes: HTN, Hyperlipdemia


Pulmonary: No: COPD


Endocrine: Yes: Diabetes Mellitus





- Alcohol/Substance Use


Hx Alcohol Use: No





- Smoking History


Smoking history: Never smoked


Have you smoked in the past 12 months: No


Aproximately how many cigarettes per day: 0





Home Medications





- Allergies


Allergies/Adverse Reactions: 


 Allergies











Allergy/AdvReac Type Severity Reaction Status Date / Time


 


shellfish derived AdvReac   Verified 10/03/17 17:57














- Home Medications


Home Medications: 


Ambulatory Orders





Aspirin [ASA -] 81 mg PO DAILY #0 tab.chew 01/04/13 


Atorvastatin Calcium [Lipitor] 10 mg PO HS #0 tablet 01/04/13 


Levothyroxine [Synthroid -] 75 mcg PO DAILY@0700 #0 tablet 01/04/13 


Valsartan [Diovan] 80 mg PO DAILY 02/03/14 


Esomeprazole Magnesium [Nexium 24Hr] 20 mg PO DAILY 10/03/17 


Glimepiride [Amaryl -] 0 mg PO DAILY 10/03/17 


Sitagliptin Phos/Metformin HCl [Janumet  mg Tablet] 1 each PO BID 10/03/

17 











Physical Exam-Neuro


Vital Signs: 


 


 Vital Signs











Temperature  97.9 F   10/05/17 05:00


 


Pulse Rate  69   10/05/17 05:00


 


Respiratory Rate  20   10/05/17 05:00


 


Blood Pressure  113/60   10/05/17 05:00


 


O2 Sat by Pulse Oximetry (%)  98   10/04/17 20:26














Constitutional: Yes: Well Nourished, No Distress


Labs: 


 CBC, BMP





 10/04/17 05:10 





 10/04/17 05:10 





 INR, PTT











INR  1.00  (0.82-1.09)   10/03/17  22:00    














- Neuro Exam


Level Of Consciousness: Yes: Alert (awake, alert, no dysarthria , EOMI, no 

facial, motor 5/5, no drift, no ataxia, reflexes symmetric )





Imaging





- Results


Cat Scan: Report Reviewed, Image Reviewed





Problem List





- Problems


(1) Diabetes


Code(s): E11.9 - TYPE 2 DIABETES MELLITUS WITHOUT COMPLICATIONS   





(2) HLD (hyperlipidemia)


Code(s): E78.5 - HYPERLIPIDEMIA, UNSPECIFIED   





(3) HTN (hypertension)


Code(s): I10 - ESSENTIAL (PRIMARY) HYPERTENSION   





(4) Stroke


Code(s): I63.9 - CEREBRAL INFARCTION, UNSPECIFIED   Qualifiers: 


     CVA mechanism: thrombosis     Precerebral and cerebral artery: unspecified 

cerebral artery     Qualified Code(s): I63.30 - Cerebral infarction due to 

thrombosis of unspecified cerebral artery; I63.30 - Cerebral infarction due to 

thrombosis of unspecified cerebral artery  








Assessment/Plan


82 year old female, with a significant past medical history of diabetes, 

peripheral neuropathies, and  HTN, who came into the emergency department with 

her family complaining of a persistent headache and dizziness(lightheadedness) 

for 3 weeks. Patient states she has has been dizzy and lightheaded since 

falling at home 3 weeks ago. HD CT 10/3/17 : possible R Internal capsule 

subacute CVA, MRI BRAIN (-) for acute stroke. 


nonfocal exam -- possible post concussive ,r /o temporal arteritis


start PT  and FU ESR





Dr Santizo 


[phone]








Problem List





- Problems


(1) Diabetes


Code(s): E11.9 - TYPE 2 DIABETES MELLITUS WITHOUT COMPLICATIONS   





(2) HLD (hyperlipidemia)


Code(s): E78.5 - HYPERLIPIDEMIA, UNSPECIFIED   





(3) HTN (hypertension)


Code(s): I10 - ESSENTIAL (PRIMARY) HYPERTENSION   





(4) Stroke


Code(s): I63.9 - CEREBRAL INFARCTION, UNSPECIFIED   Qualifiers: 


     CVA mechanism: thrombosis     Precerebral and cerebral artery: unspecified 

cerebral artery     Qualified Code(s): I63.30 - Cerebral infarction due to 

thrombosis of unspecified cerebral artery; I63.30 - Cerebral infarction due to 

thrombosis of unspecified cerebral artery

## 2017-10-05 NOTE — PN
Progress Note, Physician


Chief Complaint: 


ambulating no complaint,denies vertigo,has mild ballance issue 


History of Present Illness: 


dm,cva,htn hyperlipidemia,now clinically improved





- Current Medication List


Current Medications: 


Active Medications





Acetaminophen (Tylenol -)  650 mg PO Q4H PRN


   PRN Reason: FEVER OR PAIN


Aspirin (Asa -)  81 mg PO DAILY [facility]


Atorvastatin Calcium (Lipitor -)  40 mg PO HS [facility]


   Last Admin: 10/05/17 21:02 Dose:  40 mg


Heparin Sodium (Porcine) (Heparin -)  5,000 unit SQ BID [facility]


   Last Admin: 10/05/17 21:02 Dose:  Not Given


Insulin Aspart (Novolog Vial Sliding Scale -)  1 vial SQ ACHS [facility]


   PRN Reason: Protocol


   Last Admin: 10/05/17 21:01 Dose:  Not Given


Levothyroxine Sodium (Synthroid -)  75 mcg PO DAILY@0700 [facility]


   Last Admin: 10/05/17 06:25 Dose:  75 mcg


Metoprolol Tartrate (Lopressor -)  25 mg PO DAILY [facility]


   Last Admin: 10/05/17 09:26 Dose:  25 mg


Valsartan (Diovan -)  160 mg PO DAILY [facility]


   Last Admin: 10/05/17 09:26 Dose:  160 mg











- Objective


Vital Signs: 


 Vital Signs











Temperature  98.6 F   10/05/17 20:00


 


Pulse Rate  72   10/05/17 20:00


 


Respiratory Rate  20   10/05/17 20:00


 


Blood Pressure  136/63   10/05/17 20:00


 


O2 Sat by Pulse Oximetry (%)  96   10/05/17 21:00











Constitutional: Yes: Well Nourished


Eyes: Yes: EOM Intact


HENT: Yes: Normocephalic


Neck: Yes: Trachea Midline


Cardiovascular: Yes: Regular Rate and Rhythm


Respiratory: Yes: Regular


Gastrointestinal: Yes: Normal Bowel Sounds


...Rectal Exam: Yes: Deferred


Breast(s): Yes: WNL


Musculoskeletal: Yes: WNL


Extremities: Yes: WNL


Edema: No


Peripheral Pulses WNL: Yes


Integumentary: Yes: WNL


Neurological: Yes: WNL, Alert, Oriented


Labs: 


 CBC, BMP





 10/04/17 05:10 





 10/04/17 05:10 





 INR, PTT











INR  1.00  (0.82-1.09)   10/03/17  22:00    














Problem List





- Problems


(1) Ataxia


Code(s): R27.0 - ATAXIA, UNSPECIFIED





(2) Diabetes


Code(s): E11.9 - TYPE 2 DIABETES MELLITUS WITHOUT COMPLICATIONS   





(3) HLD (hyperlipidemia)


Code(s): E78.5 - HYPERLIPIDEMIA, UNSPECIFIED   





(4) HTN (hypertension)


Code(s): I10 - ESSENTIAL (PRIMARY) HYPERTENSION   





(5) Stroke


Code(s): I63.9 - CEREBRAL INFARCTION, UNSPECIFIED   Qualifiers: 


     CVA mechanism: thrombosis     Precerebral and cerebral artery: unspecified 

cerebral artery     Qualified Code(s): I63.30 - Cerebral infarction due to 

thrombosis of unspecified cerebral artery; I63.30 - Cerebral infarction due to 

thrombosis of unspecified cerebral artery  








Assessment/Plan


Current Active Problems





Ataxia (Acute) 


Diabetes (Acute) 


HLD (hyperlipidemia) (Acute) 


HTN (hypertension) (Acute) 


Hypothyroid (Acute) 


Stroke (Acute) 





 Laboratory Results - last 24 hr











  10/05/17 10/05/17 10/05/17





  05:15 05:15 05:27


 


ESR  21  


 


POC Glucometer    86


 


TSH   1.20  D 


 


Free T4   1.24 














  10/05/17 10/05/17 10/05/17





  11:34 17:04 20:46


 


ESR   


 


POC Glucometer  133  135  175


 


TSH   


 


Free T4   








plan:


continue with bp controll


asa for cva


bgm novolog insulin


physical therapy

## 2017-10-06 VITALS — SYSTOLIC BLOOD PRESSURE: 128 MMHG | DIASTOLIC BLOOD PRESSURE: 74 MMHG | TEMPERATURE: 98.2 F | HEART RATE: 84 BPM

## 2017-10-06 RX ADMIN — METOPROLOL TARTRATE SCH MG: 25 TABLET, FILM COATED ORAL at 09:00

## 2017-10-06 RX ADMIN — LEVOTHYROXINE SODIUM SCH MCG: 75 TABLET ORAL at 06:08

## 2017-10-06 RX ADMIN — VALSARTAN SCH MG: 160 TABLET, FILM COATED ORAL at 09:00

## 2017-10-06 RX ADMIN — INSULIN ASPART SCH: 100 INJECTION, SOLUTION INTRAVENOUS; SUBCUTANEOUS at 06:08

## 2017-10-06 RX ADMIN — HEPARIN SODIUM SCH UNIT: 5000 INJECTION, SOLUTION INTRAVENOUS; SUBCUTANEOUS at 09:00

## 2017-10-06 NOTE — PN
Progress Note (short form)





- Note


Progress Note: 


82 year old female, with a significant past medical history of diabetes, 

peripheral neuropathies, and  HTN, who came into the emergency department with 

her family complaining of a persistent headache and dizziness(lightheadedness) 

for 3 weeks. Patient states she has has been dizzy and lightheaded since 

falling at home 3 weeks ago. She recalls incident, she was going to kitchen and 

then living room , felt dizziness and hit head on L. CT of the head and neck 

done on 9/19 at an outpatient clinic. CT results show no fractures or bleed. no 

focal weakness or slurred speech or numbness.


HD CT 10/3/17 : possible R Internal capsule subacute CVA 





FU : 





dizziness better , mild lingering HA 


ESR -21


MRI noted chronic BG infarct, no acute stroke, Doppler - no hemodyn. sig 

stenosis








- History Source


History Provided By: Patient





- Past Medical History


Cardio/Vascular: Yes: HTN, Hyperlipdemia


Pulmonary: No: COPD


Endocrine: Yes: Diabetes Mellitus





- Alcohol/Substance Use


Hx Alcohol Use: No





- Smoking History


Smoking history: Never smoked


Have you smoked in the past 12 months: No


Aproximately how many cigarettes per day: 0





Home Medications





- Allergies


Allergies/Adverse Reactions: 


 Allergies











Allergy/AdvReac Type Severity Reaction Status Date / Time


 


shellfish derived AdvReac   Verified 10/03/17 17:57














- Home Medications


Home Medications: 


Ambulatory Orders





Aspirin [ASA -] 81 mg PO DAILY #0 tab.chew 01/04/13 


Atorvastatin Calcium [Lipitor] 10 mg PO HS #0 tablet 01/04/13 


Levothyroxine [Synthroid -] 75 mcg PO DAILY@0700 #0 tablet 01/04/13 


Valsartan [Diovan] 80 mg PO DAILY 02/03/14 


Esomeprazole Magnesium [Nexium 24Hr] 20 mg PO DAILY 10/03/17 


Glimepiride [Amaryl -] 0 mg PO DAILY 10/03/17 


Sitagliptin Phos/Metformin HCl [Janumet  mg Tablet] 1 each PO BID 10/03/

17 











Physical Exam-Neuro


Vital Signs: 


 


 Vital Signs











Temperature  97.9 F   10/05/17 05:00


 


Pulse Rate  69   10/05/17 05:00


 


Respiratory Rate  20   10/05/17 05:00


 


Blood Pressure  113/60   10/05/17 05:00


 


O2 Sat by Pulse Oximetry (%)  98   10/04/17 20:26














Constitutional: Yes: Well Nourished, No Distress


Labs: 


 CBC, BMP





 10/04/17 05:10 





 10/04/17 05:10 





 INR, PTT











INR  1.00  (0.82-1.09)   10/03/17  22:00    














- Neuro Exam


Level Of Consciousness: Yes: Alert (awake, alert, no dysarthria , EOMI, no 

facial, motor 5/5, no drift, no ataxia, reflexes symmetric )





Imaging





- Results


Cat Scan: Report Reviewed, Image Reviewed





Problem List





- Problems


(1) Diabetes


Code(s): E11.9 - TYPE 2 DIABETES MELLITUS WITHOUT COMPLICATIONS   





(2) HLD (hyperlipidemia)


Code(s): E78.5 - HYPERLIPIDEMIA, UNSPECIFIED   





(3) HTN (hypertension)


Code(s): I10 - ESSENTIAL (PRIMARY) HYPERTENSION   





(4) Stroke


Code(s): I63.9 - CEREBRAL INFARCTION, UNSPECIFIED   Qualifiers: 


     CVA mechanism: thrombosis     Precerebral and cerebral artery: unspecified 

cerebral artery     Qualified Code(s): I63.30 - Cerebral infarction due to 

thrombosis of unspecified cerebral artery; I63.30 - Cerebral infarction due to 

thrombosis of unspecified cerebral artery  








Assessment/Plan


82 year old female, with a significant past medical history of diabetes, 

peripheral neuropathies, and  HTN, who came into the emergency department with 

her family complaining of a persistent headache and dizziness(lightheadedness) 

for 3 weeks. Patient states she has has been dizzy and lightheaded since 

falling at home 3 weeks ago. HD CT 10/3/17 : possible R Internal capsule 

subacute CVA, MRI BRAIN (-) for acute stroke. 


nonfocal exam -- most likely post concussive Sx with HA 


no evidence of temporal arteritis (ESR (-)





stable for Dc home from neuro standpoint 





Dr Santizo


[phone]

















Problem List





- Problems


(1) Diabetes


Code(s): E11.9 - TYPE 2 DIABETES MELLITUS WITHOUT COMPLICATIONS   





(2) HLD (hyperlipidemia)


Code(s): E78.5 - HYPERLIPIDEMIA, UNSPECIFIED   





(3) HTN (hypertension)


Code(s): I10 - ESSENTIAL (PRIMARY) HYPERTENSION   





(4) Stroke


Code(s): I63.9 - CEREBRAL INFARCTION, UNSPECIFIED   Qualifiers: 


     CVA mechanism: thrombosis     Precerebral and cerebral artery: unspecified 

cerebral artery     Qualified Code(s): I63.30 - Cerebral infarction due to 

thrombosis of unspecified cerebral artery; I63.30 - Cerebral infarction due to 

thrombosis of unspecified cerebral artery

## 2017-10-06 NOTE — CONS
DATE OF CONSULTATION:  10/06/2017

 

REFERRING PHYSICIAN:  Ray Rose MD

 

HISTORY OF PRESENT ILLNESS:  Patient is an 82-year-old woman with past medical

history of diabetes who was admitted on October 3 or early on October 4 with

headache, dizziness/lightheadedness.  Patient fell about 3 weeks prior to admission

and noted unsteady gait.  A cane has been recommended, but she has not used it yet. 

She did undergo a CT of the head on admission as well as a brain MRI which

demonstrated right internal capsule chronic infarcts.  Patient also has a history of

diabetic peripheral neuropathy.  She had normal CBC on admission and followup blood

work on October 4 showed WBC 7.0, hemoglobin 12.2, platelet count 182.  Patient also

underwent chemistry and had slight elevation BUN 21, creatinine 0.9.  Hemoglobin A1c

is fairly good at 6.1.  Albumin low at 3.3.  Elevated triglycerides of 1.14. 

C-reactive protein was less than 0.3 and troponin less than 0.02.  Patient also

underwent carotid Doppler study which showed mild atherosclerotic disease but no

hemodynamically significant stenosis.  Patient is followed by Neurology and was seen

by Physical Therapy.  Able to ambulate about 50 feet with light contact guard,

transfer supervision.  A slight loss of balance was noted.  Patient currently feels

fairly well with only a slight headache.  No blurry vision, double vision.  No

current dizziness, but she does get light headed at times.

 

REVIEW OF PAST MEDICAL AND SURGICAL HISTORY:  Diabetes, diabetic peripheral

neuropathy, hypertension.

 

SOCIAL HISTORY:  Lives with her  in a private house.  She has got about 10

steps to enter and she can stay on 1 level but does have stairs inside the home. 

Premorbidly, she states she ambulated without assistive device.  Does not smoke

tobacco and does not drink alcohol.

 

REVIEW OF SYSTEMS:  Again, a little bit of lightheadedness and a headache but no

radha vertigo.  No blurry vision, double vision.  No nausea, vomiting, difficulty

swallowing, difficulty chewing.  No chest pain or shortness of breath.  No fever,

chills.  No bowel, bladder incontinence, but she does have chronic ongoing frequent

urination and urge incontinence.  She has also some numbness and tingling in the

distal lower extremities but none in the upper extremities.  No joint arthralgias. 

No neck or back pain.  No skin rash.  No significant weight loss, weight gain.

 

PHYSICAL EXAMINATION:

General:  Slightly overweight woman seen both sitting, standing as well as ambulating

in the room.

HEENT:  She is normocephalic and atraumatic.  Her extraocular muscles appear intact. 

She has no obvious facial weakness.  No ulcers or dry mucous membranes.

NECK:  Supple.

EXTREMITIES:  Without any pitting edema or calf tenderness.

NEUROMUSCULAR:  She is awake, alert, oriented x3.  Cranial nerves II-XII grossly

intact.  She has good motor power in the upper and lower extremities and normal

sensation to light touch, pinprick even distally in the lower extremities.  She has

normal and symmetric reflexes in the upper extremities, depressed in the lower

extremities.  Toes equivocal.  She is able to perform bed mobility, stand with fairly

good balance and ambulate without any loss of balance and without device at the

bedside, just handheld supervision.

 

OVERALL IMPRESSION:

1.  Deficits of mobility and activities of daily living with appear to be very mild,

possible balance disorder.

2.  Possible subacute or chronic cerebrovascular accident, right basal ganglion.

3.  Underlying diabetic peripheral neuropathy.

4.  Diabetes.

5.  Hypertension.

6.  Hypothyroidism.

7.  Hypoalbuminemia.

8.  Status post renal insufficiency. 

 

PLAN/SUGGESTIONS:

1.  Physical therapy to continue at the bedside.

2.  Out of bed to chair.

3.  Encourage p.o. fluids.

4.  Diabetic foot precautions.

5.  Safety fall precautions.

6.  Consider dietary consultation or nutritional support.

7.  Patient could probably go home with home care outpatient therapy once medically

stabilized.

Thank you for this referral.

 

 

NICO HAIRSTON M.D.

 

[id number]

DD: 10/06/2017 08:02

DT: 10/06/2017 08:27

Job #:  [id number]

## 2017-10-06 NOTE — DS
Physical Examination


Vital Signs: 


 Vital Signs











Temperature  98.2 F   10/06/17 08:47


 


Pulse Rate  84   10/06/17 08:47


 


Respiratory Rate  18   10/06/17 08:47


 


Blood Pressure  128/74   10/06/17 08:47


 


O2 Sat by Pulse Oximetry (%)  96   10/06/17 08:47








awake alert dizziness resolved Headache is less intense


Constitutional: Yes: Calm


Neck: Yes: Trachea Midline


Cardiovascular: Yes: Regular Rate and Rhythm, S1, S2


Respiratory: Yes: CTA Bilaterally


Gastrointestinal: Yes: Normal Bowel Sounds, Soft


Edema: No


Neurological: Yes: Alert, Oriented


Labs: 


 CBC, BMP





 10/04/17 05:10 





 10/04/17 05:10 











Discharge Summary


Reason For Visit: DIABETESMELLITUS/ATAXIA/CEREBROVASCULAR ACCIDENT


Current Active Problems





Ataxia (Acute) 


Diabetes (Acute) 


HLD (hyperlipidemia) (Acute) 


HTN (hypertension) (Acute) 


Hypothyroid (Acute) 


Stroke (Acute) 








Hospital Course: 


v Admission


History of Present Illness: 


82 year old female, with a significant past medical history of diabetes, 

peripheral neuropathies, and  HTN, who came into the emergency department with 

her family complaining of a persistent headache and dizziness(lightheadedness) 

for 3 weeks. Patient states she has has been dizzy and lightheaded since 

falling at home 3 weeks ago. She also has an unsteady gait. She hit her head 

when she fell and had a CT of the head and neck done on 9/19 at an outpatient 

clinic. CT results show no fractures or bleed. 





- Past Medical History


Cardiovascular: Yes: HTN, Hyperlipdemia


Pulmonary: No: COPD


Endocrine: Yes: Diabetes Mellitus





- Smoking History


Smoking history: Never smoked


Have you smoked in the past 12 months: No


Aproximately how many cigarettes per day: 0





- Alcohol/Substance Use


Hx Alcohol Use: No








seen by neurology and cardiology


had MRI and MRA show old BG infarct 


old CVA on aspirin and statin


carotid doppler and echo is ok


headache is less intense


ESR normal





HTN and tachyacardia NSR


stop diovan increase metoprolol 75mg bid





hypothyroid on synthroid 75 mcg daily


Condition: Improved





- Instructions


Diet, Activity, Other Instructions: 


FU with PMD in 2 weeks


stop diovan


Referrals: 


Ray Rose MD [Primary Care Provider] - 


Disposition: HOME





- Home Medications


Comprehensive Discharge Medication List: 


Ambulatory Orders





Aspirin [ASA -] 81 mg PO DAILY #0 tab.chew 01/04/13 


Atorvastatin Calcium [Lipitor] 10 mg PO HS #0 tablet 01/04/13 


Levothyroxine [Synthroid -] 75 mcg PO DAILY@0700 #0 tablet 01/04/13 


Valsartan [Diovan] 80 mg PO DAILY 02/03/14 


Esomeprazole Magnesium [Nexium 24Hr] 20 mg PO DAILY 10/03/17 


Glimepiride [Amaryl -] 0 mg PO DAILY 10/03/17 


Sitagliptin Phos/Metformin HCl [Janumet  mg Tablet] 1 each PO BID 10/03/

17

## 2017-10-10 NOTE — PDOC
tPA Exclusion checklist 3-4.5h





- Time Elapsed


Date last known well: 10/01/17


Time last known well: 08:00 (not sure of specific time)


Elaspsed time: 8 Day(s) and 18 Hour(s) and 39 Minutes 





- Thrombolytic Therapy Candidate


Is patient eligible for thrombolytic therapy: No





- Exclusion Criteria 3-4.5 hr


SBP greater than 185 or DBP greater than 110mmHg despite tx: No


Recent IC/spinal surgery,head trauma or stroke<3mos.: Yes


Hx IC hemorrhage, IC neoplasm, AV malformation or aneurysm: No


Active internal bleeding: No


Blding diathesis(low plt ct, inc PTT,INR>1.7 or use of NOAC): No


Symptoms suggest subarachnoid hemorrhage: No


CT demonstrates multilobar infarct(>1/3 cerebral hemiphere): Yes


Arterial puncture at noncompressible site in previous 7 days: No


Blood glucose concentration less than 50mg/dL (2.7mmol/L): No





- Relative Exclusion Criteria 3-4.5 hr


Life expectancy <1 yr or severe co-morbid illness: No


Pregnancy: No


Patient/family refused: No


Stroke severity too mild: Yes


Recent acute MI (w/in previous 3 months): No


Seizure at onset with postictal residual neuro impairments: No


Major surgery or serious trauma w/in previous 14 days: No


Recent GI or  hemorrhage (w/in previous 21 days): No





- Add'l Relative Exclusion 3-4.5 hr


Age > 80: Yes


Hx of both diabetes AND prior ischemic stroke: No


NIHSS >25: No





- Ineligibility reason(s)


Reasons No tPA given: Outside of window - delayed arrival

## 2017-10-10 NOTE — PDOC
NIH Stroke Scale





- Last Known Well Date/Time & Onset


Date Last Known Well: 10/01/17


Time Last Known Well: 08:00 (not sure of specific time)





- Initial Evaluation


Level of consciousness: Alert


Ask patient the month and their age: Answers both correctly


Ask patient to open & close eyes; make fist and let go: Obeys both correctly


Best gaze (horizontal eye movement): Normal


Visual field testing: No visual field loss


Facial paresis (Show teeth/raise eyebrows/close eyes tight): Normal symmetrical 

movement


Motor Function: Left Arm: Normal


Motor Function:  Right Arm: Normal (extends arm 90 (or 45) degrees for 10 

seconds without drift


Motor Function:  Left Leg: Normal (extends leg 30 degrees for 5 seconds without 

drift)


Motor Function:  Right Leg: Normal (extends leg 30 degrees for 5 seconds 

without drift)


Limb Ataxia: Present in one limb


Sensory(Use pinprick test arms,legs,trunk,face/side to side): Normal


Best language (Describe picture, name items, read sentences): No Aphasia


Dysarthria (read several words): Normal articulation


Extinction and Inattention: No abnormality





- Total Score


NIH Stroke Scale Score: 1

## 2021-12-23 ENCOUNTER — HOSPITAL ENCOUNTER (INPATIENT)
Dept: HOSPITAL 74 - JER | Age: 86
LOS: 6 days | Discharge: HOME | DRG: 177 | End: 2021-12-29
Attending: NURSE PRACTITIONER | Admitting: HOSPITALIST
Payer: COMMERCIAL

## 2021-12-23 VITALS — BODY MASS INDEX: 36.5 KG/M2

## 2021-12-23 DIAGNOSIS — E11.649: ICD-10-CM

## 2021-12-23 DIAGNOSIS — N17.9: ICD-10-CM

## 2021-12-23 DIAGNOSIS — R09.02: ICD-10-CM

## 2021-12-23 DIAGNOSIS — J12.82: ICD-10-CM

## 2021-12-23 DIAGNOSIS — G93.41: ICD-10-CM

## 2021-12-23 DIAGNOSIS — I48.0: ICD-10-CM

## 2021-12-23 DIAGNOSIS — E03.9: ICD-10-CM

## 2021-12-23 DIAGNOSIS — E78.00: ICD-10-CM

## 2021-12-23 DIAGNOSIS — U07.1: Primary | ICD-10-CM

## 2021-12-23 DIAGNOSIS — M10.9: ICD-10-CM

## 2021-12-23 DIAGNOSIS — R41.82: ICD-10-CM

## 2021-12-23 DIAGNOSIS — I45.10: ICD-10-CM

## 2021-12-23 DIAGNOSIS — K21.9: ICD-10-CM

## 2021-12-23 DIAGNOSIS — E16.2: ICD-10-CM

## 2021-12-23 DIAGNOSIS — N39.0: ICD-10-CM

## 2021-12-23 DIAGNOSIS — I48.91: ICD-10-CM

## 2021-12-23 DIAGNOSIS — B96.20: ICD-10-CM

## 2021-12-23 LAB
ALBUMIN SERPL-MCNC: 2.6 G/DL (ref 3.4–5)
ALP SERPL-CCNC: 64 U/L (ref 45–117)
ALT SERPL-CCNC: 16 U/L (ref 13–61)
ANION GAP SERPL CALC-SCNC: 10 MMOL/L (ref 8–16)
APPEARANCE UR: CLEAR
APTT BLD: 32.6 SECONDS (ref 25.2–36.5)
AST SERPL-CCNC: 25 U/L (ref 15–37)
BACTERIA # UR AUTO: 3997 /UL (ref 0–1359)
BASOPHILS # BLD: 0.1 % (ref 0–2)
BILIRUB SERPL-MCNC: 0.5 MG/DL (ref 0.2–1)
BILIRUB UR STRIP.AUTO-MCNC: NEGATIVE MG/DL
BUN SERPL-MCNC: 35.8 MG/DL (ref 7–18)
CALCIUM SERPL-MCNC: 7.3 MG/DL (ref 8.5–10.1)
CASTS URNS QL MICRO: 1 /UL (ref 0–3.1)
CHLORIDE SERPL-SCNC: 106 MMOL/L (ref 98–107)
CO2 SERPL-SCNC: 21 MMOL/L (ref 21–32)
COLOR UR: YELLOW
CREAT SERPL-MCNC: 1.6 MG/DL (ref 0.55–1.3)
DEPRECATED RDW RBC AUTO: 15.8 % (ref 11.6–15.6)
EOSINOPHIL # BLD: 0 % (ref 0–4.5)
EPITH CASTS URNS QL MICRO: 15 /UL (ref 0–25.1)
GLUCOSE SERPL-MCNC: 221 MG/DL (ref 74–106)
HCT VFR BLD CALC: 32.5 % (ref 32.4–45.2)
HGB BLD-MCNC: 10.6 GM/DL (ref 10.7–15.3)
INR BLD: 1.07 (ref 0.83–1.09)
KETONES UR QL STRIP: NEGATIVE
LEUKOCYTE ESTERASE UR QL STRIP.AUTO: (no result)
LYMPHOCYTES # BLD: 10.7 % (ref 8–40)
MCH RBC QN AUTO: 28.5 PG (ref 25.7–33.7)
MCHC RBC AUTO-ENTMCNC: 32.6 G/DL (ref 32–36)
MCV RBC: 87.3 FL (ref 80–96)
MONOCYTES # BLD AUTO: 7.8 % (ref 3.8–10.2)
NEUTROPHILS # BLD: 81.4 % (ref 42.8–82.8)
NITRITE UR QL STRIP: NEGATIVE
PH UR: 5.5 [PH] (ref 5–8)
PLATELET # BLD AUTO: 152 10^3/UL (ref 134–434)
PMV BLD: 8.7 FL (ref 7.5–11.1)
PROT SERPL-MCNC: 6.6 G/DL (ref 6.4–8.2)
PROT UR QL STRIP: (no result)
PROT UR QL STRIP: NEGATIVE
PT PNL PPP: 12 SEC (ref 9.7–13)
RBC # BLD AUTO: 3.73 M/MM3 (ref 3.6–5.2)
RBC # BLD AUTO: 5 /UL (ref 0–23.9)
SODIUM SERPL-SCNC: 137 MMOL/L (ref 136–145)
SP GR UR: 1.01 (ref 1.01–1.03)
UROBILINOGEN UR STRIP-MCNC: 0.2 MG/DL (ref 0.2–1)
WBC # BLD AUTO: 7 K/MM3 (ref 4–10)
WBC # UR AUTO: 65 /UL (ref 0–25.8)

## 2021-12-23 PROCEDURE — C9399 UNCLASSIFIED DRUGS OR BIOLOG: HCPCS

## 2021-12-23 PROCEDURE — C9803 HOPD COVID-19 SPEC COLLECT: HCPCS

## 2021-12-23 PROCEDURE — U0003 INFECTIOUS AGENT DETECTION BY NUCLEIC ACID (DNA OR RNA); SEVERE ACUTE RESPIRATORY SYNDROME CORONAVIRUS 2 (SARS-COV-2) (CORONAVIRUS DISEASE [COVID-19]), AMPLIFIED PROBE TECHNIQUE, MAKING USE OF HIGH THROUGHPUT TECHNOLOGIES AS DESCRIBED BY CMS-2020-01-R: HCPCS

## 2021-12-23 PROCEDURE — U0005 INFEC AGEN DETEC AMPLI PROBE: HCPCS

## 2021-12-24 LAB
ANION GAP SERPL CALC-SCNC: 8 MMOL/L (ref 8–16)
BASOPHILS # BLD: 2 % (ref 0–2)
BILIRUB DIRECT SERPL-MCNC: 258 U/L (ref 84–246)
BUN SERPL-MCNC: 26.3 MG/DL (ref 7–18)
CALCIUM SERPL-MCNC: 7 MG/DL (ref 8.5–10.1)
CHLORIDE SERPL-SCNC: 113 MMOL/L (ref 98–107)
CO2 SERPL-SCNC: 20 MMOL/L (ref 21–32)
CREAT SERPL-MCNC: 1.2 MG/DL (ref 0.55–1.3)
DEPRECATED RDW RBC AUTO: 15.9 % (ref 11.6–15.6)
EOSINOPHIL # BLD: 0 % (ref 0–4.5)
GLUCOSE SERPL-MCNC: 117 MG/DL (ref 74–106)
HCT VFR BLD CALC: 29.1 % (ref 32.4–45.2)
HGB BLD-MCNC: 9.5 GM/DL (ref 10.7–15.3)
LYMPHOCYTES # BLD: 20.6 % (ref 8–40)
MAGNESIUM SERPL-MCNC: 1.6 MG/DL (ref 1.8–2.4)
MCH RBC QN AUTO: 28.6 PG (ref 25.7–33.7)
MCHC RBC AUTO-ENTMCNC: 32.6 G/DL (ref 32–36)
MCV RBC: 87.8 FL (ref 80–96)
MONOCYTES # BLD AUTO: 4.7 % (ref 3.8–10.2)
NEUTROPHILS # BLD: 72.7 % (ref 42.8–82.8)
PHOSPHATE SERPL-MCNC: 3 MG/DL (ref 2.5–4.9)
PLATELET # BLD AUTO: 151 10^3/UL (ref 134–434)
PMV BLD: 8.6 FL (ref 7.5–11.1)
RBC # BLD AUTO: 3.32 M/MM3 (ref 3.6–5.2)
SODIUM SERPL-SCNC: 141 MMOL/L (ref 136–145)
WBC # BLD AUTO: 5.8 K/MM3 (ref 4–10)

## 2021-12-24 PROCEDURE — XW033E5 INTRODUCTION OF REMDESIVIR ANTI-INFECTIVE INTO PERIPHERAL VEIN, PERCUTANEOUS APPROACH, NEW TECHNOLOGY GROUP 5: ICD-10-PCS | Performed by: INTERNAL MEDICINE

## 2021-12-24 RX ADMIN — HEPARIN SODIUM SCH UNIT: 5000 INJECTION, SOLUTION INTRAVENOUS; SUBCUTANEOUS at 21:42

## 2021-12-24 RX ADMIN — INSULIN ASPART SCH UNITS: 100 INJECTION, SOLUTION INTRAVENOUS; SUBCUTANEOUS at 21:48

## 2021-12-24 RX ADMIN — Medication SCH MG: at 11:36

## 2021-12-24 RX ADMIN — INSULIN ASPART SCH: 100 INJECTION, SOLUTION INTRAVENOUS; SUBCUTANEOUS at 11:54

## 2021-12-24 RX ADMIN — INSULIN ASPART SCH UNITS: 100 INJECTION, SOLUTION INTRAVENOUS; SUBCUTANEOUS at 18:21

## 2021-12-24 RX ADMIN — INSULIN ASPART SCH: 100 INJECTION, SOLUTION INTRAVENOUS; SUBCUTANEOUS at 06:41

## 2021-12-24 RX ADMIN — HEPARIN SODIUM SCH UNIT: 5000 INJECTION, SOLUTION INTRAVENOUS; SUBCUTANEOUS at 15:12

## 2021-12-24 RX ADMIN — ATORVASTATIN CALCIUM SCH MG: 40 TABLET, FILM COATED ORAL at 21:42

## 2021-12-24 RX ADMIN — ASPIRIN 81 MG SCH MG: 81 TABLET ORAL at 11:36

## 2021-12-24 RX ADMIN — AZITHROMYCIN SCH MG: 250 TABLET, FILM COATED ORAL at 11:36

## 2021-12-24 RX ADMIN — DEXAMETHASONE SODIUM PHOSPHATE SCH MG: 4 INJECTION, SOLUTION INTRAMUSCULAR; INTRAVENOUS at 11:35

## 2021-12-24 RX ADMIN — LEVOTHYROXINE SODIUM SCH MCG: 88 TABLET ORAL at 11:34

## 2021-12-24 RX ADMIN — ALLOPURINOL SCH MG: 100 TABLET ORAL at 11:36

## 2021-12-24 RX ADMIN — OXYCODONE HYDROCHLORIDE AND ACETAMINOPHEN SCH MG: 500 TABLET ORAL at 11:36

## 2021-12-24 RX ADMIN — HEPARIN SODIUM SCH: 5000 INJECTION, SOLUTION INTRAVENOUS; SUBCUTANEOUS at 06:08

## 2021-12-25 LAB
ALBUMIN SERPL-MCNC: 2.2 G/DL (ref 3.4–5)
ALP SERPL-CCNC: 58 U/L (ref 45–117)
ALT SERPL-CCNC: 18 U/L (ref 13–61)
ANION GAP SERPL CALC-SCNC: 11 MMOL/L (ref 8–16)
AST SERPL-CCNC: 29 U/L (ref 15–37)
BASOPHILS # BLD: 0 % (ref 0–2)
BILIRUB SERPL-MCNC: 0.2 MG/DL (ref 0.2–1)
BUN SERPL-MCNC: 28.1 MG/DL (ref 7–18)
CALCIUM SERPL-MCNC: 8.4 MG/DL (ref 8.5–10.1)
CHLORIDE SERPL-SCNC: 115 MMOL/L (ref 98–107)
CO2 SERPL-SCNC: 18 MMOL/L (ref 21–32)
CREAT SERPL-MCNC: 1.1 MG/DL (ref 0.55–1.3)
DEPRECATED RDW RBC AUTO: 16 % (ref 11.6–15.6)
EOSINOPHIL # BLD: 0 % (ref 0–4.5)
GLUCOSE SERPL-MCNC: 200 MG/DL (ref 74–106)
HCT VFR BLD CALC: 32 % (ref 32.4–45.2)
HGB BLD-MCNC: 10.3 GM/DL (ref 10.7–15.3)
LYMPHOCYTES # BLD: 9.4 % (ref 8–40)
MAGNESIUM SERPL-MCNC: 1.9 MG/DL (ref 1.8–2.4)
MCH RBC QN AUTO: 28.3 PG (ref 25.7–33.7)
MCHC RBC AUTO-ENTMCNC: 32.3 G/DL (ref 32–36)
MCV RBC: 87.8 FL (ref 80–96)
MONOCYTES # BLD AUTO: 3.4 % (ref 3.8–10.2)
NEUTROPHILS # BLD: 87.2 % (ref 42.8–82.8)
PLATELET # BLD AUTO: 206 10^3/UL (ref 134–434)
PMV BLD: 8.6 FL (ref 7.5–11.1)
PROT SERPL-MCNC: 6.2 G/DL (ref 6.4–8.2)
RBC # BLD AUTO: 3.65 M/MM3 (ref 3.6–5.2)
SODIUM SERPL-SCNC: 144 MMOL/L (ref 136–145)
WBC # BLD AUTO: 8.4 K/MM3 (ref 4–10)

## 2021-12-25 RX ADMIN — OXYCODONE HYDROCHLORIDE AND ACETAMINOPHEN SCH MG: 500 TABLET ORAL at 09:44

## 2021-12-25 RX ADMIN — AZITHROMYCIN SCH MG: 250 TABLET, FILM COATED ORAL at 09:44

## 2021-12-25 RX ADMIN — INSULIN ASPART SCH UNITS: 100 INJECTION, SOLUTION INTRAVENOUS; SUBCUTANEOUS at 21:00

## 2021-12-25 RX ADMIN — INSULIN ASPART SCH UNITS: 100 INJECTION, SOLUTION INTRAVENOUS; SUBCUTANEOUS at 05:59

## 2021-12-25 RX ADMIN — CEFTRIAXONE SCH MLS/HR: 1 INJECTION, POWDER, FOR SOLUTION INTRAMUSCULAR; INTRAVENOUS at 11:40

## 2021-12-25 RX ADMIN — APIXABAN SCH MG: 2.5 TABLET, FILM COATED ORAL at 21:00

## 2021-12-25 RX ADMIN — METOPROLOL TARTRATE PRN MG: 5 INJECTION, SOLUTION INTRAVENOUS at 09:47

## 2021-12-25 RX ADMIN — REMDESIVIR SCH MLS/HR: 5 INJECTION INTRAVENOUS at 12:32

## 2021-12-25 RX ADMIN — DEXAMETHASONE SODIUM PHOSPHATE SCH MG: 4 INJECTION, SOLUTION INTRAMUSCULAR; INTRAVENOUS at 09:43

## 2021-12-25 RX ADMIN — INSULIN ASPART SCH UNITS: 100 INJECTION, SOLUTION INTRAVENOUS; SUBCUTANEOUS at 17:27

## 2021-12-25 RX ADMIN — ALLOPURINOL SCH MG: 100 TABLET ORAL at 09:44

## 2021-12-25 RX ADMIN — ASPIRIN 81 MG SCH MG: 81 TABLET ORAL at 09:44

## 2021-12-25 RX ADMIN — APIXABAN SCH MG: 2.5 TABLET, FILM COATED ORAL at 12:32

## 2021-12-25 RX ADMIN — Medication SCH MG: at 09:44

## 2021-12-25 RX ADMIN — INSULIN ASPART SCH UNITS: 100 INJECTION, SOLUTION INTRAVENOUS; SUBCUTANEOUS at 11:51

## 2021-12-25 RX ADMIN — HEPARIN SODIUM SCH UNIT: 5000 INJECTION, SOLUTION INTRAVENOUS; SUBCUTANEOUS at 05:48

## 2021-12-25 RX ADMIN — LEVOTHYROXINE SODIUM SCH MCG: 88 TABLET ORAL at 06:19

## 2021-12-25 RX ADMIN — ATORVASTATIN CALCIUM SCH MG: 40 TABLET, FILM COATED ORAL at 21:00

## 2021-12-26 LAB
ALBUMIN SERPL-MCNC: 2.3 G/DL (ref 3.4–5)
ALP SERPL-CCNC: 61 U/L (ref 45–117)
ALT SERPL-CCNC: 20 U/L (ref 13–61)
ANION GAP SERPL CALC-SCNC: 11 MMOL/L (ref 8–16)
AST SERPL-CCNC: 23 U/L (ref 15–37)
BASOPHILS # BLD: 0 % (ref 0–2)
BILIRUB SERPL-MCNC: 0.2 MG/DL (ref 0.2–1)
BUN SERPL-MCNC: 29.3 MG/DL (ref 7–18)
CALCIUM SERPL-MCNC: 8.5 MG/DL (ref 8.5–10.1)
CHLORIDE SERPL-SCNC: 110 MMOL/L (ref 98–107)
CO2 SERPL-SCNC: 22 MMOL/L (ref 21–32)
CREAT SERPL-MCNC: 1.2 MG/DL (ref 0.55–1.3)
DEPRECATED RDW RBC AUTO: 15.6 % (ref 11.6–15.6)
EOSINOPHIL # BLD: 0 % (ref 0–4.5)
GLUCOSE SERPL-MCNC: 194 MG/DL (ref 74–106)
HCT VFR BLD CALC: 31.5 % (ref 32.4–45.2)
HGB BLD-MCNC: 10.5 GM/DL (ref 10.7–15.3)
LYMPHOCYTES # BLD: 6.4 % (ref 8–40)
MAGNESIUM SERPL-MCNC: 1.7 MG/DL (ref 1.8–2.4)
MCH RBC QN AUTO: 28.6 PG (ref 25.7–33.7)
MCHC RBC AUTO-ENTMCNC: 33.2 G/DL (ref 32–36)
MCV RBC: 86 FL (ref 80–96)
MONOCYTES # BLD AUTO: 3.8 % (ref 3.8–10.2)
NEUTROPHILS # BLD: 89.8 % (ref 42.8–82.8)
PLATELET # BLD AUTO: 235 10^3/UL (ref 134–434)
PMV BLD: 8.2 FL (ref 7.5–11.1)
PROT SERPL-MCNC: 6.4 G/DL (ref 6.4–8.2)
RBC # BLD AUTO: 3.66 M/MM3 (ref 3.6–5.2)
SODIUM SERPL-SCNC: 143 MMOL/L (ref 136–145)
WBC # BLD AUTO: 10.3 K/MM3 (ref 4–10)

## 2021-12-26 RX ADMIN — INSULIN ASPART SCH UNITS: 100 INJECTION, SOLUTION INTRAVENOUS; SUBCUTANEOUS at 17:07

## 2021-12-26 RX ADMIN — ALLOPURINOL SCH MG: 100 TABLET ORAL at 10:33

## 2021-12-26 RX ADMIN — REMDESIVIR SCH MLS/HR: 5 INJECTION INTRAVENOUS at 11:26

## 2021-12-26 RX ADMIN — APIXABAN SCH MG: 2.5 TABLET, FILM COATED ORAL at 22:11

## 2021-12-26 RX ADMIN — LEVOTHYROXINE SODIUM SCH MCG: 88 TABLET ORAL at 06:43

## 2021-12-26 RX ADMIN — AMLODIPINE BESYLATE SCH MG: 2.5 TABLET ORAL at 11:25

## 2021-12-26 RX ADMIN — CEFTRIAXONE SCH MLS/HR: 1 INJECTION, POWDER, FOR SOLUTION INTRAMUSCULAR; INTRAVENOUS at 10:32

## 2021-12-26 RX ADMIN — INSULIN ASPART SCH UNITS: 100 INJECTION, SOLUTION INTRAVENOUS; SUBCUTANEOUS at 22:11

## 2021-12-26 RX ADMIN — Medication SCH MG: at 10:33

## 2021-12-26 RX ADMIN — ATORVASTATIN CALCIUM SCH MG: 40 TABLET, FILM COATED ORAL at 22:11

## 2021-12-26 RX ADMIN — DEXAMETHASONE SODIUM PHOSPHATE SCH MG: 4 INJECTION, SOLUTION INTRAMUSCULAR; INTRAVENOUS at 10:32

## 2021-12-26 RX ADMIN — INSULIN ASPART SCH UNITS: 100 INJECTION, SOLUTION INTRAVENOUS; SUBCUTANEOUS at 06:43

## 2021-12-26 RX ADMIN — INSULIN ASPART SCH UNITS: 100 INJECTION, SOLUTION INTRAVENOUS; SUBCUTANEOUS at 11:32

## 2021-12-26 RX ADMIN — AZITHROMYCIN SCH MG: 250 TABLET, FILM COATED ORAL at 10:32

## 2021-12-26 RX ADMIN — OXYCODONE HYDROCHLORIDE AND ACETAMINOPHEN SCH MG: 500 TABLET ORAL at 10:33

## 2021-12-26 RX ADMIN — APIXABAN SCH MG: 2.5 TABLET, FILM COATED ORAL at 10:32

## 2021-12-27 RX ADMIN — DEXAMETHASONE SODIUM PHOSPHATE SCH MG: 4 INJECTION, SOLUTION INTRAMUSCULAR; INTRAVENOUS at 13:13

## 2021-12-27 RX ADMIN — OXYCODONE HYDROCHLORIDE AND ACETAMINOPHEN SCH MG: 500 TABLET ORAL at 13:12

## 2021-12-27 RX ADMIN — REMDESIVIR SCH MLS/HR: 5 INJECTION INTRAVENOUS at 13:12

## 2021-12-27 RX ADMIN — Medication SCH MG: at 13:12

## 2021-12-27 RX ADMIN — ATORVASTATIN CALCIUM SCH MG: 40 TABLET, FILM COATED ORAL at 22:09

## 2021-12-27 RX ADMIN — CEFTRIAXONE SCH MLS/HR: 1 INJECTION, POWDER, FOR SOLUTION INTRAMUSCULAR; INTRAVENOUS at 13:11

## 2021-12-27 RX ADMIN — INSULIN ASPART SCH UNITS: 100 INJECTION, SOLUTION INTRAVENOUS; SUBCUTANEOUS at 15:45

## 2021-12-27 RX ADMIN — APIXABAN SCH MG: 2.5 TABLET, FILM COATED ORAL at 13:13

## 2021-12-27 RX ADMIN — INSULIN ASPART SCH UNITS: 100 INJECTION, SOLUTION INTRAVENOUS; SUBCUTANEOUS at 13:35

## 2021-12-27 RX ADMIN — LEVOTHYROXINE SODIUM SCH MCG: 88 TABLET ORAL at 06:46

## 2021-12-27 RX ADMIN — INSULIN ASPART SCH UNITS: 100 INJECTION, SOLUTION INTRAVENOUS; SUBCUTANEOUS at 06:44

## 2021-12-27 RX ADMIN — AMLODIPINE BESYLATE SCH MG: 2.5 TABLET ORAL at 13:13

## 2021-12-27 RX ADMIN — AZITHROMYCIN SCH MG: 250 TABLET, FILM COATED ORAL at 13:12

## 2021-12-27 RX ADMIN — INSULIN ASPART SCH UNITS: 100 INJECTION, SOLUTION INTRAVENOUS; SUBCUTANEOUS at 22:19

## 2021-12-27 RX ADMIN — ALLOPURINOL SCH MG: 100 TABLET ORAL at 13:12

## 2021-12-27 RX ADMIN — APIXABAN SCH MG: 2.5 TABLET, FILM COATED ORAL at 22:09

## 2021-12-28 LAB
BASOPHILS # BLD: 0.1 % (ref 0–2)
DEPRECATED RDW RBC AUTO: 15.9 % (ref 11.6–15.6)
EOSINOPHIL # BLD: 0 % (ref 0–4.5)
HCT VFR BLD CALC: 33 % (ref 32.4–45.2)
HGB BLD-MCNC: 10.9 GM/DL (ref 10.7–15.3)
LYMPHOCYTES # BLD: 6.3 % (ref 8–40)
MCH RBC QN AUTO: 28.1 PG (ref 25.7–33.7)
MCHC RBC AUTO-ENTMCNC: 33 G/DL (ref 32–36)
MCV RBC: 85.4 FL (ref 80–96)
MONOCYTES # BLD AUTO: 3.3 % (ref 3.8–10.2)
NEUTROPHILS # BLD: 90.3 % (ref 42.8–82.8)
PLATELET # BLD AUTO: 267 10^3/UL (ref 134–434)
PMV BLD: 8.2 FL (ref 7.5–11.1)
RBC # BLD AUTO: 3.87 M/MM3 (ref 3.6–5.2)
WBC # BLD AUTO: 12.6 K/MM3 (ref 4–10)

## 2021-12-28 RX ADMIN — CEFUROXIME AXETIL SCH MG: 500 TABLET ORAL at 22:07

## 2021-12-28 RX ADMIN — METOPROLOL TARTRATE PRN MG: 5 INJECTION, SOLUTION INTRAVENOUS at 00:47

## 2021-12-28 RX ADMIN — APIXABAN SCH MG: 2.5 TABLET, FILM COATED ORAL at 09:31

## 2021-12-28 RX ADMIN — LEVOTHYROXINE SODIUM SCH MCG: 88 TABLET ORAL at 06:13

## 2021-12-28 RX ADMIN — APIXABAN SCH MG: 2.5 TABLET, FILM COATED ORAL at 22:06

## 2021-12-28 RX ADMIN — CEFTRIAXONE SCH MLS/HR: 1 INJECTION, POWDER, FOR SOLUTION INTRAMUSCULAR; INTRAVENOUS at 09:30

## 2021-12-28 RX ADMIN — INSULIN ASPART SCH UNIT: 100 INJECTION, SOLUTION INTRAVENOUS; SUBCUTANEOUS at 17:21

## 2021-12-28 RX ADMIN — REMDESIVIR SCH MLS/HR: 5 INJECTION INTRAVENOUS at 11:41

## 2021-12-28 RX ADMIN — INSULIN ASPART SCH UNITS: 100 INJECTION, SOLUTION INTRAVENOUS; SUBCUTANEOUS at 11:48

## 2021-12-28 RX ADMIN — INSULIN ASPART SCH UNITS: 100 INJECTION, SOLUTION INTRAVENOUS; SUBCUTANEOUS at 06:17

## 2021-12-28 RX ADMIN — INSULIN ASPART SCH UNITS: 100 INJECTION, SOLUTION INTRAVENOUS; SUBCUTANEOUS at 22:08

## 2021-12-28 RX ADMIN — Medication SCH MG: at 09:31

## 2021-12-28 RX ADMIN — ALLOPURINOL SCH MG: 100 TABLET ORAL at 09:31

## 2021-12-28 RX ADMIN — ATORVASTATIN CALCIUM SCH MG: 40 TABLET, FILM COATED ORAL at 22:06

## 2021-12-28 RX ADMIN — AMLODIPINE BESYLATE SCH MG: 2.5 TABLET ORAL at 09:32

## 2021-12-28 RX ADMIN — DEXAMETHASONE SODIUM PHOSPHATE SCH MG: 4 INJECTION, SOLUTION INTRAMUSCULAR; INTRAVENOUS at 09:32

## 2021-12-28 RX ADMIN — OXYCODONE HYDROCHLORIDE AND ACETAMINOPHEN SCH MG: 500 TABLET ORAL at 09:31

## 2021-12-29 VITALS — SYSTOLIC BLOOD PRESSURE: 141 MMHG | HEART RATE: 64 BPM | TEMPERATURE: 98.3 F | DIASTOLIC BLOOD PRESSURE: 64 MMHG

## 2021-12-29 LAB
ALBUMIN SERPL-MCNC: 2.3 G/DL (ref 3.4–5)
ALP SERPL-CCNC: 63 U/L (ref 45–117)
ALT SERPL-CCNC: 20 U/L (ref 13–61)
ANION GAP SERPL CALC-SCNC: 10 MMOL/L (ref 8–16)
AST SERPL-CCNC: 16 U/L (ref 15–37)
BASOPHILS # BLD: 0 % (ref 0–2)
BILIRUB SERPL-MCNC: 0.3 MG/DL (ref 0.2–1)
BUN SERPL-MCNC: 38.2 MG/DL (ref 7–18)
CALCIUM SERPL-MCNC: 8.3 MG/DL (ref 8.5–10.1)
CHLORIDE SERPL-SCNC: 106 MMOL/L (ref 98–107)
CO2 SERPL-SCNC: 27 MMOL/L (ref 21–32)
CREAT SERPL-MCNC: 1.1 MG/DL (ref 0.55–1.3)
DEPRECATED RDW RBC AUTO: 15.6 % (ref 11.6–15.6)
EOSINOPHIL # BLD: 0 % (ref 0–4.5)
GLUCOSE SERPL-MCNC: 209 MG/DL (ref 74–106)
HCT VFR BLD CALC: 33.2 % (ref 32.4–45.2)
HGB BLD-MCNC: 11.1 GM/DL (ref 10.7–15.3)
LYMPHOCYTES # BLD: 11.3 % (ref 8–40)
MAGNESIUM SERPL-MCNC: 1.7 MG/DL (ref 1.8–2.4)
MCH RBC QN AUTO: 28.5 PG (ref 25.7–33.7)
MCHC RBC AUTO-ENTMCNC: 33.3 G/DL (ref 32–36)
MCV RBC: 85.6 FL (ref 80–96)
MONOCYTES # BLD AUTO: 8.6 % (ref 3.8–10.2)
NEUTROPHILS # BLD: 80.1 % (ref 42.8–82.8)
PLATELET # BLD AUTO: 283 10^3/UL (ref 134–434)
PMV BLD: 7.9 FL (ref 7.5–11.1)
PROT SERPL-MCNC: 6 G/DL (ref 6.4–8.2)
RBC # BLD AUTO: 3.88 M/MM3 (ref 3.6–5.2)
SODIUM SERPL-SCNC: 142 MMOL/L (ref 136–145)
WBC # BLD AUTO: 12.1 K/MM3 (ref 4–10)

## 2021-12-29 RX ADMIN — ALLOPURINOL SCH MG: 100 TABLET ORAL at 10:17

## 2021-12-29 RX ADMIN — Medication SCH MG: at 10:17

## 2021-12-29 RX ADMIN — APIXABAN SCH MG: 2.5 TABLET, FILM COATED ORAL at 10:17

## 2021-12-29 RX ADMIN — LEVOTHYROXINE SODIUM SCH MCG: 88 TABLET ORAL at 06:46

## 2021-12-29 RX ADMIN — CEFUROXIME AXETIL SCH MG: 500 TABLET ORAL at 10:19

## 2021-12-29 RX ADMIN — OXYCODONE HYDROCHLORIDE AND ACETAMINOPHEN SCH MG: 500 TABLET ORAL at 10:17

## 2021-12-29 RX ADMIN — INSULIN ASPART SCH UNITS: 100 INJECTION, SOLUTION INTRAVENOUS; SUBCUTANEOUS at 06:46

## 2021-12-29 RX ADMIN — INSULIN ASPART SCH UNITS: 100 INJECTION, SOLUTION INTRAVENOUS; SUBCUTANEOUS at 11:52

## 2021-12-29 RX ADMIN — DEXAMETHASONE SODIUM PHOSPHATE SCH MG: 4 INJECTION, SOLUTION INTRAMUSCULAR; INTRAVENOUS at 10:16
